# Patient Record
Sex: FEMALE | Race: ASIAN | NOT HISPANIC OR LATINO
[De-identification: names, ages, dates, MRNs, and addresses within clinical notes are randomized per-mention and may not be internally consistent; named-entity substitution may affect disease eponyms.]

---

## 2021-01-08 ENCOUNTER — APPOINTMENT (OUTPATIENT)
Dept: FAMILY MEDICINE | Facility: CLINIC | Age: 51
End: 2021-01-08
Payer: COMMERCIAL

## 2021-01-08 VITALS
WEIGHT: 102 LBS | BODY MASS INDEX: 21.41 KG/M2 | SYSTOLIC BLOOD PRESSURE: 116 MMHG | HEART RATE: 75 BPM | OXYGEN SATURATION: 100 % | TEMPERATURE: 98.4 F | HEIGHT: 58 IN | DIASTOLIC BLOOD PRESSURE: 76 MMHG

## 2021-01-08 DIAGNOSIS — Z82.49 FAMILY HISTORY OF ISCHEMIC HEART DISEASE AND OTHER DISEASES OF THE CIRCULATORY SYSTEM: ICD-10-CM

## 2021-01-08 DIAGNOSIS — Z83.3 FAMILY HISTORY OF DIABETES MELLITUS: ICD-10-CM

## 2021-01-08 DIAGNOSIS — Z78.9 OTHER SPECIFIED HEALTH STATUS: ICD-10-CM

## 2021-01-08 PROCEDURE — 36415 COLL VENOUS BLD VENIPUNCTURE: CPT

## 2021-01-08 PROCEDURE — 90686 IIV4 VACC NO PRSV 0.5 ML IM: CPT

## 2021-01-08 PROCEDURE — 99072 ADDL SUPL MATRL&STAF TM PHE: CPT

## 2021-01-08 PROCEDURE — 99386 PREV VISIT NEW AGE 40-64: CPT | Mod: 25

## 2021-01-08 PROCEDURE — G0008: CPT

## 2021-01-08 NOTE — HISTORY OF PRESENT ILLNESS
[FreeTextEntry1] : annual [de-identified] : 49 yo F with no significant PMH presents for annual. She is home health aide. Pt's PCP was Dr. Pearl who recently passed. She reports having hx brain mass found on CT incidentally many years ago. Pt was told that if she did not have any symptoms, did not need to keep following. She currently denies any headaches, vision changes, dizziness, numbness/weakness, etc. \par \par diet-- regular\par exercise-- active at work, does not exercise

## 2021-01-08 NOTE — HEALTH RISK ASSESSMENT
[No] : In the past 12 months have you used drugs other than those required for medical reasons? No [Patient reported mammogram was normal] : Patient reported mammogram was normal [Patient reported PAP Smear was normal] : Patient reported PAP Smear was normal [HIV test declined] : HIV test declined [Hepatitis C test declined] : Hepatitis C test declined [Fully functional (bathing, dressing, toileting, transferring, walking, feeding)] : Fully functional (bathing, dressing, toileting, transferring, walking, feeding) [Fully functional (using the telephone, shopping, preparing meals, housekeeping, doing laundry, using] : Fully functional and needs no help or supervision to perform IADLs (using the telephone, shopping, preparing meals, housekeeping, doing laundry, using transportation, managing medications and managing finances) [] : No [LTS1Exsmd] : 2 [MammogramDate] : 2016 [PapSmearDate] : 2019

## 2021-01-12 LAB
BASOPHILS # BLD AUTO: 0.03 K/UL
BASOPHILS NFR BLD AUTO: 0.5 %
CHOLEST SERPL-MCNC: 191 MG/DL
EOSINOPHIL # BLD AUTO: 0.1 K/UL
EOSINOPHIL NFR BLD AUTO: 1.7 %
ESTIMATED AVERAGE GLUCOSE: 108 MG/DL
HBA1C MFR BLD HPLC: 5.4 %
HCT VFR BLD CALC: 38.3 %
HDLC SERPL-MCNC: 49 MG/DL
HGB BLD-MCNC: 12.6 G/DL
IMM GRANULOCYTES NFR BLD AUTO: 0.2 %
LDLC SERPL CALC-MCNC: 129 MG/DL
LYMPHOCYTES # BLD AUTO: 1.72 K/UL
LYMPHOCYTES NFR BLD AUTO: 28.9 %
MAN DIFF?: NORMAL
MCHC RBC-ENTMCNC: 28.7 PG
MCHC RBC-ENTMCNC: 32.9 GM/DL
MCV RBC AUTO: 87.2 FL
MONOCYTES # BLD AUTO: 0.51 K/UL
MONOCYTES NFR BLD AUTO: 8.6 %
NEUTROPHILS # BLD AUTO: 3.58 K/UL
NEUTROPHILS NFR BLD AUTO: 60.1 %
NONHDLC SERPL-MCNC: 142 MG/DL
PLATELET # BLD AUTO: 225 K/UL
RBC # BLD: 4.39 M/UL
RBC # FLD: 11.9 %
TRIGL SERPL-MCNC: 64 MG/DL
TSH SERPL-ACNC: 1.6 UIU/ML
WBC # FLD AUTO: 5.95 K/UL

## 2021-01-20 LAB — HEMOCCULT STL QL IA: NEGATIVE

## 2021-02-16 LAB
ALBUMIN SERPL ELPH-MCNC: 4.6 G/DL
ALP BLD-CCNC: 70 U/L
ALT SERPL-CCNC: 11 U/L
ANION GAP SERPL CALC-SCNC: 15 MMOL/L
AST SERPL-CCNC: 17 U/L
BILIRUB SERPL-MCNC: 0.3 MG/DL
BUN SERPL-MCNC: 9 MG/DL
CALCIUM SERPL-MCNC: 9.5 MG/DL
CHLORIDE SERPL-SCNC: 103 MMOL/L
CO2 SERPL-SCNC: 21 MMOL/L
CREAT SERPL-MCNC: 0.76 MG/DL
GLUCOSE SERPL-MCNC: 103 MG/DL
POTASSIUM SERPL-SCNC: 4 MMOL/L
PROT SERPL-MCNC: 7.6 G/DL
SODIUM SERPL-SCNC: 140 MMOL/L

## 2021-03-11 ENCOUNTER — APPOINTMENT (OUTPATIENT)
Dept: NEUROLOGY | Facility: CLINIC | Age: 51
End: 2021-03-11

## 2021-04-19 ENCOUNTER — APPOINTMENT (OUTPATIENT)
Dept: FAMILY MEDICINE | Facility: CLINIC | Age: 51
End: 2021-04-19
Payer: COMMERCIAL

## 2021-04-19 PROCEDURE — 99072 ADDL SUPL MATRL&STAF TM PHE: CPT

## 2021-04-19 PROCEDURE — 36415 COLL VENOUS BLD VENIPUNCTURE: CPT

## 2021-04-22 LAB
M TB IFN-G BLD-IMP: NEGATIVE
MEV IGG FLD QL IA: >300 AU/ML
MEV IGG+IGM SER-IMP: POSITIVE
MUV AB SER-ACNC: NEGATIVE
MUV IGG SER QL IA: 7.8 AU/ML
QUANTIFERON TB PLUS MITOGEN MINUS NIL: 9.8 IU/ML
QUANTIFERON TB PLUS NIL: 0.03 IU/ML
QUANTIFERON TB PLUS TB1 MINUS NIL: 0.1 IU/ML
QUANTIFERON TB PLUS TB2 MINUS NIL: 0.06 IU/ML
RUBV IGG FLD-ACNC: 13.3 INDEX
RUBV IGG SER-IMP: POSITIVE
VZV AB TITR SER: POSITIVE
VZV IGG SER IF-ACNC: 1654 INDEX

## 2021-06-08 DIAGNOSIS — R51.9 HEADACHE, UNSPECIFIED: ICD-10-CM

## 2021-06-10 ENCOUNTER — APPOINTMENT (OUTPATIENT)
Dept: PAIN MANAGEMENT | Facility: CLINIC | Age: 51
End: 2021-06-10
Payer: COMMERCIAL

## 2021-06-10 VITALS
BODY MASS INDEX: 20.57 KG/M2 | SYSTOLIC BLOOD PRESSURE: 139 MMHG | DIASTOLIC BLOOD PRESSURE: 70 MMHG | WEIGHT: 98 LBS | HEART RATE: 103 BPM | HEIGHT: 58 IN

## 2021-06-10 DIAGNOSIS — G43.709 CHRONIC MIGRAINE W/OUT AURA, NOT INTRACTABLE, W/OUT STATUS MIGRAINOSUS: ICD-10-CM

## 2021-06-10 PROCEDURE — 99204 OFFICE O/P NEW MOD 45 MIN: CPT

## 2021-06-11 NOTE — PHYSICAL EXAM
[General Appearance - Alert] : alert [General Appearance - Well Nourished] : well nourished [General Appearance - Well Developed] : well developed [Impaired Insight] : insight and judgment were intact [Affect] : the affect was normal [Mood] : the mood was normal [Memory Recent] : recent memory was not impaired [Memory Remote] : remote memory was not impaired [Person] : oriented to person [Place] : oriented to place [Time] : oriented to time [Short Term Intact] : short term memory intact [Remote Intact] : remote memory intact [Registration Intact] : recent registration memory intact [Span Intact] : the attention span was normal [Concentration Intact] : normal concentrating ability [Visual Intact] : visual attention was ~T not ~L decreased [Writing A Sentence] : no difficulty writing a sentence [Fluency] : fluency intact [Comprehension] : comprehension intact [Reading] : reading intact [Current Events] : adequate knowledge of current events [Past History] : adequate knowledge of personal past history [Cranial Nerves Oculomotor (III)] : extraocular motion intact [Cranial Nerves Facial (VII)] : face symmetrical [Cranial Nerves Vestibulocochlear (VIII)] : hearing was intact bilaterally [Cranial Nerves Accessory (XI - Cranial And Spinal)] : head turning and shoulder shrug symmetric [Cranial Nerves Hypoglossal (XII)] : there was no tongue deviation with protrusion [No Muscle Atrophy] : normal bulk in all four extremities [Motor Handedness Right-Handed] : the patient is right hand dominant [Motor Strength Upper Extremities Bilaterally] : strength was normal in both upper extremities [Sensation Tactile Decrease] : light touch was intact [Tremor] : no tremor present [Dysdiadochokinesia Bilaterally] : not present [Sclera] : the sclera and conjunctiva were normal [Extraocular Movements] : extraocular movements were intact [No DAVID] : no internuclear ophthalmoplegia [Strabismus] : no strabismus was seen [Outer Ear] : the ears and nose were normal in appearance [Hearing Threshold Finger Rub Not [place]] : hearing was normal [Neck Appearance] : the appearance of the neck was normal [Neck Cervical Mass (___cm)] : no neck mass was observed [Exaggerated Use Of Accessory Muscles For Inspiration] : no accessory muscle use [Abnormal Walk] : normal gait [Involuntary Movements] : no involuntary movements were seen [Musculoskeletal - Swelling] : no joint swelling seen [Skin Color & Pigmentation] : normal skin color and pigmentation [] : no rash

## 2021-06-11 NOTE — HISTORY OF PRESENT ILLNESS
[FreeTextEntry1] : Pt is 51 yo woman who notes headaches which had worsened acutely around 7 years ago.  She had imaging done in [place] which showed ostensible non malignant mass- the pictures she arrived with appeared to possibly be fundoscopic images.  She continues to have headaches nearly monthly for an extended period to which she derives some benefit from Advil.  Some photophobia, mild phonophobia and no clear nausea.  Does get some neck pain separately from the headache events.\par No clear trigger.\par Does worsen with movement.\par No clear family history.\par Does get some allodynia with that pain as well.\par Uses advil with some benefit.\par Does get occasional numbness into head on both sides posteriorly in association with pain.\par No change in vision \par Occasional itchiness in ear but no change in hearing or pulsatile tinnitus.\par No change in gait and no other noted complaints.\par  [Headache] : headache [Nausea] : nausea [Photophobia] : photophobia [Phonophobia] : phonophobia [Neck Pain] : neck pain [Numbness] : numbness [Scalp Tenderness] : scalp tenderness [> 4 hours] : > 4 hours [4] : a minimum pain level of 4/10 [8] : a maximum pain level of 8/10 [Worsened] : The patient reports ~his/her~ symptoms since the last visit have worsened [de-identified] : variable

## 2021-06-11 NOTE — REVIEW OF SYSTEMS
[Feeling Tired] : feeling tired [Eye Pain] : eye pain [Arthralgias] : arthralgias [Neck Pain] : neck pain [Sleep Disturbances] : sleep disturbances [Fever] : no fever [Feeling Poorly] : not feeling poorly [Eyesight Problems] : no eyesight problems [Loss Of Hearing] : no hearing loss [Nasal Discharge] : no nasal discharge [Chest Pain] : no chest pain [Palpitations] : no palpitations [Shortness Of Breath] : no shortness of breath [Cough] : no cough [Constipation] : no constipation [Lower Back Pain] : no lower back pain [Skin Lesions] : no skin lesions [Skin Wound] : no skin wound [Itching] : no itching [Convulsions] : no convulsions [Fainting] : no fainting [Muscle Weakness] : no muscle weakness [Swollen Glands] : no swollen glands

## 2021-06-11 NOTE — ASSESSMENT
[FreeTextEntry1] : Would plan for mri brain given history of brain mass and worsening headache\par trial of prn rizatriptan\par referral to [facility] website

## 2021-07-25 ENCOUNTER — APPOINTMENT (OUTPATIENT)
Dept: MRI IMAGING | Facility: CLINIC | Age: 51
End: 2021-07-25

## 2021-08-05 ENCOUNTER — RESULT REVIEW (OUTPATIENT)
Age: 51
End: 2021-08-05

## 2021-08-07 ENCOUNTER — OUTPATIENT (OUTPATIENT)
Dept: OUTPATIENT SERVICES | Facility: HOSPITAL | Age: 51
LOS: 1 days | End: 2021-08-07
Payer: COMMERCIAL

## 2021-08-07 ENCOUNTER — APPOINTMENT (OUTPATIENT)
Dept: MRI IMAGING | Facility: CLINIC | Age: 51
End: 2021-08-07
Payer: COMMERCIAL

## 2021-08-07 DIAGNOSIS — N83.20 UNSPECIFIED OVARIAN CYSTS: Chronic | ICD-10-CM

## 2021-08-07 DIAGNOSIS — D49.6 NEOPLASM OF UNSPECIFIED BEHAVIOR OF BRAIN: ICD-10-CM

## 2021-08-07 PROCEDURE — A9585: CPT

## 2021-08-07 PROCEDURE — 70553 MRI BRAIN STEM W/O & W/DYE: CPT

## 2021-08-07 PROCEDURE — 70553 MRI BRAIN STEM W/O & W/DYE: CPT | Mod: 26

## 2021-08-10 ENCOUNTER — NON-APPOINTMENT (OUTPATIENT)
Age: 51
End: 2021-08-10

## 2021-08-18 ENCOUNTER — APPOINTMENT (OUTPATIENT)
Dept: NEUROLOGY | Facility: CLINIC | Age: 51
End: 2021-08-18
Payer: COMMERCIAL

## 2021-08-18 PROCEDURE — 99215 OFFICE O/P EST HI 40 MIN: CPT

## 2021-08-18 NOTE — DATA REVIEWED
[de-identified] : I personally reviewed neuroimaging dated\par 8/7/2021			\par \par On the contrast enhanced images, there is DURALLY-BASED LEFT MID-FRONTAL enhancement, WITH MODERATE SURROUNDING HYPERINTENSITY on the T2 weighted images, with signal change likely representing cerebral edema. \par DWI imaging shows no acute CVA.\par Overall I find the images to be most consistent with a meningioma abutting the superior sagittal since and causing both mass effect and cerebral edema on the left frontal lobe. \par Selected imaging was provided to the patient, along with a detailed verbal explanation of the issues at hand as outlined above.\par

## 2021-08-18 NOTE — HISTORY OF PRESENT ILLNESS
[FreeTextEntry1] : This is a pleasant 51 year old woman with longstanding history of headaches and brain mass found on imaging years ago. She has recent imaging showing a large left frontal mass, likely meningioma.\par \par She reports headaches over the past 8 years. In 2013, she had an MRI in [place]. Although the images have not been reviewed, she knows where that MR facility is located and could obtain a CD-ROM with those images for us.\par \par The headaches arise "out of the blue." The last headache was in 4/25/2021 (she keeps a diary). They do NOT awaken her from sleep. They are relieved with advil or tylenol (she does NOT take aspirin). There has been no aggravating or relieving factors.\par \par She denies shaking, seizures, seizure-like activity, trouble moving her arms or legs, or aphasia.

## 2021-08-18 NOTE — DISCUSSION/SUMMARY
[FreeTextEntry1] : DURALLY-BASED LESION -- I think this is a meningioma and should be resected, given her age, proximity to the venous sinus, as well as the mass effect and cerebral edema involving the left frontal lobe.\par \par VENOUS SINUS -- I ordered MRV as the lesion is very close to the venous sinus.\par \par DISPO -- I reached out to Neurosurgery to assess for resection.

## 2021-08-18 NOTE — PHYSICAL EXAM
[General Appearance - Alert] : alert [General Appearance - In No Acute Distress] : in no acute distress [Oriented To Time, Place, And Person] : oriented to person, place, and time [Impaired Insight] : insight and judgment were intact [Affect] : the affect was normal [Person] : oriented to person [Place] : oriented to place [Time] : oriented to time [Concentration Intact] : normal concentrating ability [Visual Intact] : visual attention was ~T not ~L decreased [Naming Objects] : no difficulty naming common objects [Repeating Phrases] : no difficulty repeating a phrase [Writing A Sentence] : no difficulty writing a sentence [Fluency] : fluency intact [Comprehension] : comprehension intact [Reading] : reading intact [Past History] : adequate knowledge of personal past history [Cranial Nerves Optic (II)] : visual acuity intact bilaterally,  visual fields full to confrontation, pupils equal round and reactive to light [Cranial Nerves Oculomotor (III)] : extraocular motion intact [Cranial Nerves Trigeminal (V)] : facial sensation intact symmetrically [Cranial Nerves Facial (VII)] : face symmetrical [Cranial Nerves Vestibulocochlear (VIII)] : hearing was intact bilaterally [Cranial Nerves Glossopharyngeal (IX)] : tongue and palate midline [Cranial Nerves Accessory (XI - Cranial And Spinal)] : head turning and shoulder shrug symmetric [Cranial Nerves Hypoglossal (XII)] : there was no tongue deviation with protrusion [Motor Tone] : muscle tone was normal in all four extremities [Motor Strength] : muscle strength was normal in all four extremities [No Muscle Atrophy] : normal bulk in all four extremities [Paresis Pronator Drift Right-Sided] : no pronator drift on the right [Paresis Pronator Drift Left-Sided] : no pronator drift on the left [Motor Strength Upper Extremities Bilaterally] : strength was normal in both upper extremities [Motor Strength Lower Extremities Bilaterally] : strength was normal in both lower extremities [Sensation Tactile Decrease] : light touch was intact [Abnormal Walk] : normal gait [Balance] : balance was intact [Past-pointing] : there was no past-pointing [Tremor] : no tremor present [2+] : Ankle jerk left 2+ [Plantar Reflex Right Only] : normal on the right [Plantar Reflex Left Only] : normal on the left [FreeTextEntry5] : 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 THE LEFT OPTIC DISC DOES NOT.

## 2021-09-22 ENCOUNTER — APPOINTMENT (OUTPATIENT)
Dept: NEUROLOGY | Facility: CLINIC | Age: 51
End: 2021-09-22
Payer: COMMERCIAL

## 2021-09-22 VITALS
OXYGEN SATURATION: 97 % | RESPIRATION RATE: 16 BRPM | HEART RATE: 90 BPM | SYSTOLIC BLOOD PRESSURE: 112 MMHG | DIASTOLIC BLOOD PRESSURE: 78 MMHG

## 2021-09-22 PROCEDURE — 99212 OFFICE O/P EST SF 10 MIN: CPT

## 2021-09-22 RX ORDER — RIZATRIPTAN BENZOATE 10 MG/1
10 TABLET ORAL
Qty: 1 | Refills: 5 | Status: DISCONTINUED | COMMUNITY
Start: 2021-06-10 | End: 2021-09-22

## 2021-09-22 NOTE — DISCUSSION/SUMMARY
[FreeTextEntry1] : Brain tumor - I encouraged her to have resection soon, while the tumor is not causing any trouble. I referred her to Dr. Ayala. She is agreeable to surgery and will follow-up. All questions answered. \par \par DISPO - Follow-up after resection.

## 2021-09-22 NOTE — PHYSICAL EXAM
[General Appearance - Alert] : alert [General Appearance - In No Acute Distress] : in no acute distress [Oriented To Time, Place, And Person] : oriented to person, place, and time [Impaired Insight] : insight and judgment were intact [Affect] : the affect was normal [Mood] : the mood was normal [Memory Recent] : recent memory was not impaired [Memory Remote] : remote memory was not impaired [Person] : oriented to person [Place] : oriented to place [Time] : oriented to time [Short Term Intact] : short term memory intact [Remote Intact] : remote memory intact [Span Intact] : the attention span was normal [Concentration Intact] : normal concentrating ability [Visual Intact] : visual attention was ~T not ~L decreased [Naming Objects] : no difficulty naming common objects [Repeating Phrases] : no difficulty repeating a phrase [Fluency] : fluency intact [Comprehension] : comprehension intact [Reading] : reading intact [Past History] : adequate knowledge of personal past history [Cranial Nerves Optic (II)] : visual acuity intact bilaterally,  visual fields full to confrontation, pupils equal round and reactive to light [Cranial Nerves Oculomotor (III)] : extraocular motion intact [Cranial Nerves Trigeminal (V)] : facial sensation intact symmetrically [Cranial Nerves Facial (VII)] : face symmetrical [Cranial Nerves Vestibulocochlear (VIII)] : hearing was intact bilaterally [Cranial Nerves Glossopharyngeal (IX)] : tongue and palate midline [Cranial Nerves Accessory (XI - Cranial And Spinal)] : head turning and shoulder shrug symmetric [Cranial Nerves Hypoglossal (XII)] : there was no tongue deviation with protrusion [Motor Tone] : muscle tone was normal in all four extremities [Motor Strength] : muscle strength was normal in all four extremities [Involuntary Movements] : no involuntary movements were seen [No Muscle Atrophy] : normal bulk in all four extremities [Sensation Tactile Decrease] : light touch was intact [Abnormal Walk] : normal gait [Balance] : balance was intact [Sclera] : the sclera and conjunctiva were normal [Extraocular Movements] : extraocular movements were intact [Respiration, Rhythm And Depth] : normal respiratory rhythm and effort [Edema] : there was no peripheral edema [Past-pointing] : there was no past-pointing [Tremor] : no tremor present [Dysdiadochokinesia Bilaterally] : not present [Coordination - Dysmetria Impaired Finger-to-Nose Bilateral] : not present

## 2021-09-22 NOTE — HISTORY OF PRESENT ILLNESS
[FreeTextEntry1] : This is a pleasant 51 year old woman with longstanding history of headaches and brain mass found on imaging years ago. She has recent imaging showing a large left frontal mass, likely meningioma.\par \par She reports headaches over the past 8 years. The headaches arise "out of the blue." They do NOT awaken her from sleep. They are relieved with advil or tylenol (she does NOT take aspirin). There has been no aggravating or relieving factors. Her last headache was over 2 weeks ago; they occur intermittently. \par \par She denies shaking, seizures, seizure-like activity, trouble moving her arms or legs, or aphasia. \par \par She presents today with new imaging, accompanied by her daughter. No new complaints.

## 2021-09-29 ENCOUNTER — NON-APPOINTMENT (OUTPATIENT)
Age: 51
End: 2021-09-29

## 2021-09-29 ENCOUNTER — APPOINTMENT (OUTPATIENT)
Dept: NEUROSURGERY | Facility: CLINIC | Age: 51
End: 2021-09-29
Payer: COMMERCIAL

## 2021-09-29 VITALS
BODY MASS INDEX: 20.57 KG/M2 | TEMPERATURE: 98.3 F | HEIGHT: 58 IN | HEART RATE: 73 BPM | DIASTOLIC BLOOD PRESSURE: 78 MMHG | SYSTOLIC BLOOD PRESSURE: 117 MMHG | WEIGHT: 98 LBS | OXYGEN SATURATION: 98 %

## 2021-09-29 PROCEDURE — 99205 OFFICE O/P NEW HI 60 MIN: CPT

## 2021-09-29 NOTE — REASON FOR VISIT
[Consultation] : a consultation visit [Referred By: _________] : Patient was referred by DALILA [Family Member] : family member

## 2021-10-04 NOTE — HISTORY OF PRESENT ILLNESS
[FreeTextEntry1] : meningioma  [de-identified] : 52 yo RH female who is completely healthy presents to the office for a neurosurgical consultation for a large left frontal meningioma. She has a history of headaches which are intermittently, but have worsened recently. She consulted with Dr. Guilherme Saravia who ordered brain MRI (8/7/2021) and showed a 3.0 cm extra axial left frontal mass representing meningioma with ME and edema. An MRA on 8/31/2021 was done as well. \par She had an MRI approximately 8 years ago which showed this lesion and as stated by the patient, it was the size of a "kidney bean". THere has been growth and her headaches have become worse. There is edema associated the the tumor and mass effect. \par no family history noted. \par \par Plan: Plan OR for meningioma resection left side\par cerebral angiogram and MRI brain with navigation day before surgery, admit to [facility] and next day surgery\par NEEDS SEDATION FOR MRI-CLAUSTROPHOBIA\par

## 2021-10-04 NOTE — PHYSICAL EXAM
[General Appearance - Alert] : alert [General Appearance - In No Acute Distress] : in no acute distress [Oriented To Time, Place, And Person] : oriented to person, place, and time [Impaired Insight] : insight and judgment were intact [Affect] : the affect was normal [Person] : oriented to person [Place] : oriented to place [Time] : oriented to time [Short Term Intact] : short term memory intact [Remote Intact] : remote memory intact [Span Intact] : the attention span was normal [Concentration Intact] : normal concentrating ability [Fluency] : fluency intact [Comprehension] : comprehension intact [Current Events] : adequate knowledge of current events [Past History] : adequate knowledge of personal past history [Vocabulary] : adequate range of vocabulary [Cranial Nerves Oculomotor (III)] : extraocular motion intact [Cranial Nerves Trigeminal (V)] : facial sensation intact symmetrically [Cranial Nerves Facial (VII)] : face symmetrical [Cranial Nerves Vestibulocochlear (VIII)] : hearing was intact bilaterally [Cranial Nerves Glossopharyngeal (IX)] : tongue and palate midline [Cranial Nerves Accessory (XI - Cranial And Spinal)] : head turning and shoulder shrug symmetric [Cranial Nerves Hypoglossal (XII)] : there was no tongue deviation with protrusion [Motor Tone] : muscle tone was normal in all four extremities [No Muscle Atrophy] : normal bulk in all four extremities [Motor Strength] : muscle strength was normal in all four extremities [Sensation Tactile Decrease] : light touch was intact [Balance] : balance was intact [Extraocular Movements] : extraocular movements were intact [Outer Ear] : the ears and nose were normal in appearance [Neck Appearance] : the appearance of the neck was normal [] : no respiratory distress [Respiration, Rhythm And Depth] : normal respiratory rhythm and effort [Exaggerated Use Of Accessory Muscles For Inspiration] : no accessory muscle use [Heart Rate And Rhythm] : heart rate was normal and rhythm regular [Abnormal Walk] : normal gait [Involuntary Movements] : no involuntary movements were seen [Skin Color & Pigmentation] : normal skin color and pigmentation [Skin Turgor] : normal skin turgor [Past-pointing] : there was no past-pointing [Tremor] : no tremor present

## 2021-10-04 NOTE — ASSESSMENT
[FreeTextEntry1] : 2021\par \par Washington Tao MD\par [facility]\par [facility]\par [address]\[place] [address]\par \par Re:	Naya Schroeder\par :	1970\par \par Dear Dr. Tao:\par \par Thank you for sending me Naya Schroeder in neurosurgical consultation.  She is a 51-year-old right-handed female who has had headaches.  She saw Dr. Guilherme Saravia and was referred to you after an MRI was performed that demonstrates a left posterior frontal extra-axial mass consistent with a meningioma.  The mass is parasagittal and seems to be connected to both the convexity dura, the falx, and the lateral wall of the superior sagittal sinus.  An MRA was also performed that was unremarkable.  \par \par The patient has a negative past medical history and a negative past surgical history.  She has no allergies.  She does not smoke.  She works as a home health aide.  She has 5 healthy children.  Family history is negative.  She is on no medication.  \par \par As mentioned, she has headache.  This is on and off, and she calls it a pressure in her eyes.  According to your examination, she had papilledema in the right eye, and of note, on the FLAIR and T2 sequences, there is subjacent edema in the brain.  She has a normal neurological examination.  Of note, she was told that she had a very small meningioma 8 years ago on an MRI performed for headaches, and the patient does not have these for review, but she is confident that it has grown.\par \par Given her young age, her good health, the subjacent edema, the headaches, and the fact that the tumor has grown, she is a candidate for microsurgical removal of the tumor.  My colleague Dr. Owens will perform an angiogram the day before to assess the superior sagittal sinus and for potential embolization, and then she will be a candidate for a left frontal craniotomy with microsurgical resection with [facility] neuronavigational guidance.  The patient will obtain a [facility] sequence MRI the day before surgery while in the hospital.  I have explained all this and explained all the indications, risks, and possible complications, inclusive of right-sided motor weakness, to the patient and her daughter who accompanied her.\par \par In summary, she is harboring a left posterior frontal parasagittal meningioma and is a candidate for surgery.\par \par I thank you, as always, for the confidence and courtesy of this referral.\par \par Sincerely,\par \par \par \par Patrick Ayala M.D., F.A.C.S.\par [facility]\par \par \par \par

## 2021-10-05 ENCOUNTER — OUTPATIENT (OUTPATIENT)
Dept: OUTPATIENT SERVICES | Facility: HOSPITAL | Age: 51
LOS: 1 days | End: 2021-10-05
Payer: COMMERCIAL

## 2021-10-05 ENCOUNTER — RESULT REVIEW (OUTPATIENT)
Age: 51
End: 2021-10-05

## 2021-10-05 VITALS
RESPIRATION RATE: 16 BRPM | SYSTOLIC BLOOD PRESSURE: 100 MMHG | HEART RATE: 67 BPM | HEIGHT: 58 IN | WEIGHT: 102.07 LBS | DIASTOLIC BLOOD PRESSURE: 60 MMHG | TEMPERATURE: 98 F

## 2021-10-05 DIAGNOSIS — N83.20 UNSPECIFIED OVARIAN CYSTS: Chronic | ICD-10-CM

## 2021-10-05 DIAGNOSIS — Z01.818 ENCOUNTER FOR OTHER PREPROCEDURAL EXAMINATION: ICD-10-CM

## 2021-10-05 DIAGNOSIS — D32.0 BENIGN NEOPLASM OF CEREBRAL MENINGES: ICD-10-CM

## 2021-10-05 DIAGNOSIS — Z29.9 ENCOUNTER FOR PROPHYLACTIC MEASURES, UNSPECIFIED: ICD-10-CM

## 2021-10-05 LAB
A1C WITH ESTIMATED AVERAGE GLUCOSE RESULT: 5.8 % — HIGH (ref 4–5.6)
ANION GAP SERPL CALC-SCNC: 15 MMOL/L — SIGNIFICANT CHANGE UP (ref 5–17)
APTT BLD: 33.5 SEC — SIGNIFICANT CHANGE UP (ref 27.5–35.5)
BASOPHILS # BLD AUTO: 0.03 K/UL — SIGNIFICANT CHANGE UP (ref 0–0.2)
BASOPHILS NFR BLD AUTO: 0.6 % — SIGNIFICANT CHANGE UP (ref 0–2)
BLD GP AB SCN SERPL QL: SIGNIFICANT CHANGE UP
BUN SERPL-MCNC: 11.2 MG/DL — SIGNIFICANT CHANGE UP (ref 8–20)
CALCIUM SERPL-MCNC: 10.1 MG/DL — SIGNIFICANT CHANGE UP (ref 8.6–10.2)
CHLORIDE SERPL-SCNC: 101 MMOL/L — SIGNIFICANT CHANGE UP (ref 98–107)
CO2 SERPL-SCNC: 24 MMOL/L — SIGNIFICANT CHANGE UP (ref 22–29)
CREAT SERPL-MCNC: 0.58 MG/DL — SIGNIFICANT CHANGE UP (ref 0.5–1.3)
EOSINOPHIL # BLD AUTO: 0.12 K/UL — SIGNIFICANT CHANGE UP (ref 0–0.5)
EOSINOPHIL NFR BLD AUTO: 2.3 % — SIGNIFICANT CHANGE UP (ref 0–6)
ESTIMATED AVERAGE GLUCOSE: 120 MG/DL — HIGH (ref 68–114)
GLUCOSE SERPL-MCNC: 115 MG/DL — HIGH (ref 70–99)
HCT VFR BLD CALC: 39.8 % — SIGNIFICANT CHANGE UP (ref 34.5–45)
HGB BLD-MCNC: 13.2 G/DL — SIGNIFICANT CHANGE UP (ref 11.5–15.5)
IMM GRANULOCYTES NFR BLD AUTO: 0.2 % — SIGNIFICANT CHANGE UP (ref 0–1.5)
INR BLD: 1.02 RATIO — SIGNIFICANT CHANGE UP (ref 0.88–1.16)
LYMPHOCYTES # BLD AUTO: 1.72 K/UL — SIGNIFICANT CHANGE UP (ref 1–3.3)
LYMPHOCYTES # BLD AUTO: 33.5 % — SIGNIFICANT CHANGE UP (ref 13–44)
MCHC RBC-ENTMCNC: 27.8 PG — SIGNIFICANT CHANGE UP (ref 27–34)
MCHC RBC-ENTMCNC: 33.2 GM/DL — SIGNIFICANT CHANGE UP (ref 32–36)
MCV RBC AUTO: 83.8 FL — SIGNIFICANT CHANGE UP (ref 80–100)
MONOCYTES # BLD AUTO: 0.39 K/UL — SIGNIFICANT CHANGE UP (ref 0–0.9)
MONOCYTES NFR BLD AUTO: 7.6 % — SIGNIFICANT CHANGE UP (ref 2–14)
MRSA PCR RESULT.: SIGNIFICANT CHANGE UP
NEUTROPHILS # BLD AUTO: 2.87 K/UL — SIGNIFICANT CHANGE UP (ref 1.8–7.4)
NEUTROPHILS NFR BLD AUTO: 55.8 % — SIGNIFICANT CHANGE UP (ref 43–77)
PLATELET # BLD AUTO: 254 K/UL — SIGNIFICANT CHANGE UP (ref 150–400)
POTASSIUM SERPL-MCNC: 4.2 MMOL/L — SIGNIFICANT CHANGE UP (ref 3.5–5.3)
POTASSIUM SERPL-SCNC: 4.2 MMOL/L — SIGNIFICANT CHANGE UP (ref 3.5–5.3)
PROTHROM AB SERPL-ACNC: 11.8 SEC — SIGNIFICANT CHANGE UP (ref 10.6–13.6)
RBC # BLD: 4.75 M/UL — SIGNIFICANT CHANGE UP (ref 3.8–5.2)
RBC # FLD: 12.4 % — SIGNIFICANT CHANGE UP (ref 10.3–14.5)
S AUREUS DNA NOSE QL NAA+PROBE: SIGNIFICANT CHANGE UP
SODIUM SERPL-SCNC: 140 MMOL/L — SIGNIFICANT CHANGE UP (ref 135–145)
WBC # BLD: 5.14 K/UL — SIGNIFICANT CHANGE UP (ref 3.8–10.5)
WBC # FLD AUTO: 5.14 K/UL — SIGNIFICANT CHANGE UP (ref 3.8–10.5)

## 2021-10-05 PROCEDURE — 71046 X-RAY EXAM CHEST 2 VIEWS: CPT | Mod: 26

## 2021-10-05 PROCEDURE — 93010 ELECTROCARDIOGRAM REPORT: CPT

## 2021-10-05 RX ORDER — INFLUENZA VIRUS VACCINE 15; 15; 15; 15 UG/.5ML; UG/.5ML; UG/.5ML; UG/.5ML
0.5 SUSPENSION INTRAMUSCULAR ONCE
Refills: 0 | Status: DISCONTINUED | OUTPATIENT
Start: 2021-10-05 | End: 2021-10-19

## 2021-10-05 NOTE — H&P PST ADULT - HISTORY OF PRESENT ILLNESS
51 year old Right Handed female present today for PSt.   She has a large left frontal meningioma. She reports history of headaches which are intermittently, but have worsened recently. She consulted with Dr. Guilherme Saravia who ordered brain MRI (8/7/2021) and showed a 3.0 cm extra axial left frontal mass representing meningioma with mass edema. An MRA on 8/31/2021 was done as well.  She had an MRI approximately 8 years ago which showed this lesion and as stated by the patient, it was the size of a "kidney bean". The headaches have become worsen as the mass grew. There is edema associated the the tumor and mass effect.   She is now schedule for cerebral angiogram before surgery to resect the meningioma.

## 2021-10-05 NOTE — PATIENT PROFILE ADULT - NSPROHMSYMPCOND_GEN_A_NUR
pre-op instructions reviewed NP to complete, MRSA/MSSA swabbed in PST. Pt is a pre-admit & informed to change Covid testing date from 10.9.21 to 10.8.21

## 2021-10-05 NOTE — H&P PST ADULT - NSICDXFAMILYHX_GEN_ALL_CORE_FT
FAMILY HISTORY:  Mother  Still living? Unknown  FH: diabetes mellitus, Age at diagnosis: Age Unknown  FH: heart attack, Age at diagnosis: Age Unknown

## 2021-10-05 NOTE — H&P PST ADULT - ASSESSMENT
51 year old Right Handed female present with a large left frontal meningioma. There is edema associated the tumor and mass effect.   She is now schedule for cerebral angiogram before surgery to resect the meningioma.   Patient educated on written and verbal preop instructions.    Patient verbalized understanding after "teach back" method of instruction were given   Medications reviewed, instructions given on what medications to take and what not to take.  Pt instructed to stop Herbals or anti-inflammatory meds one week prior to surgery and discuss with PMD.

## 2021-10-06 RX ORDER — SODIUM CHLORIDE 9 MG/ML
3 INJECTION INTRAMUSCULAR; INTRAVENOUS; SUBCUTANEOUS EVERY 8 HOURS
Refills: 0 | Status: DISCONTINUED | OUTPATIENT
Start: 2021-10-11 | End: 2021-10-12

## 2021-10-07 ENCOUNTER — NON-APPOINTMENT (OUTPATIENT)
Age: 51
End: 2021-10-07

## 2021-10-07 ENCOUNTER — LABORATORY RESULT (OUTPATIENT)
Age: 51
End: 2021-10-07

## 2021-10-07 ENCOUNTER — APPOINTMENT (OUTPATIENT)
Dept: INTERNAL MEDICINE | Facility: CLINIC | Age: 51
End: 2021-10-07
Payer: COMMERCIAL

## 2021-10-07 VITALS
TEMPERATURE: 97.1 F | SYSTOLIC BLOOD PRESSURE: 124 MMHG | BODY MASS INDEX: 21.74 KG/M2 | RESPIRATION RATE: 12 BRPM | OXYGEN SATURATION: 98 % | HEART RATE: 83 BPM | WEIGHT: 104 LBS | DIASTOLIC BLOOD PRESSURE: 78 MMHG

## 2021-10-07 PROCEDURE — 99214 OFFICE O/P EST MOD 30 MIN: CPT | Mod: 25

## 2021-10-07 PROCEDURE — 93000 ELECTROCARDIOGRAM COMPLETE: CPT

## 2021-10-07 NOTE — PHYSICAL EXAM
[No Acute Distress] : no acute distress [Well Nourished] : well nourished [Well Developed] : well developed [Well-Appearing] : well-appearing [Normal Sclera/Conjunctiva] : normal sclera/conjunctiva [PERRL] : pupils equal round and reactive to light [EOMI] : extraocular movements intact [Normal Outer Ear/Nose] : the outer ears and nose were normal in appearance [Normal Oropharynx] : the oropharynx was normal [Normal TMs] : both tympanic membranes were normal [No JVD] : no jugular venous distention [No Lymphadenopathy] : no lymphadenopathy [Supple] : supple [Thyroid Normal, No Nodules] : the thyroid was normal and there were no nodules present [No Respiratory Distress] : no respiratory distress  [No Accessory Muscle Use] : no accessory muscle use [Clear to Auscultation] : lungs were clear to auscultation bilaterally [Normal Rate] : normal rate  [Normal S1, S2] : normal S1 and S2 [Regular Rhythm] : with a regular rhythm [No Murmur] : no murmur heard [No Varicosities] : no varicosities [Pedal Pulses Present] : the pedal pulses are present [No Edema] : there was no peripheral edema [No Extremity Clubbing/Cyanosis] : no extremity clubbing/cyanosis [Soft] : abdomen soft [Non Tender] : non-tender [Non-distended] : non-distended [No Masses] : no abdominal mass palpated [Normal Bowel Sounds] : normal bowel sounds [Normal Supraclavicular Nodes] : no supraclavicular lymphadenopathy [Normal Posterior Cervical Nodes] : no posterior cervical lymphadenopathy [Normal Anterior Cervical Nodes] : no anterior cervical lymphadenopathy [No CVA Tenderness] : no CVA  tenderness [No Spinal Tenderness] : no spinal tenderness [No Joint Swelling] : no joint swelling [Grossly Normal Strength/Tone] : grossly normal strength/tone [No Rash] : no rash [Coordination Grossly Intact] : coordination grossly intact [No Focal Deficits] : no focal deficits [Normal Gait] : normal gait [Speech Grossly Normal] : speech grossly normal [Normal Affect] : the affect was normal [Alert and Oriented x3] : oriented to person, place, and time [Normal Mood] : the mood was normal [Normal Insight/Judgement] : insight and judgment were intact

## 2021-10-08 ENCOUNTER — APPOINTMENT (OUTPATIENT)
Dept: DISASTER EMERGENCY | Facility: CLINIC | Age: 51
End: 2021-10-08

## 2021-10-09 ENCOUNTER — APPOINTMENT (OUTPATIENT)
Dept: DISASTER EMERGENCY | Facility: CLINIC | Age: 51
End: 2021-10-09

## 2021-10-10 LAB — SARS-COV-2 N GENE NPH QL NAA+PROBE: NOT DETECTED

## 2021-10-11 ENCOUNTER — INPATIENT (INPATIENT)
Facility: HOSPITAL | Age: 51
LOS: 3 days | Discharge: HOME | DRG: 25 | End: 2021-10-15
Attending: NEUROLOGICAL SURGERY | Admitting: PSYCHIATRY & NEUROLOGY
Payer: COMMERCIAL

## 2021-10-11 ENCOUNTER — TRANSCRIPTION ENCOUNTER (OUTPATIENT)
Age: 51
End: 2021-10-11

## 2021-10-11 ENCOUNTER — APPOINTMENT (OUTPATIENT)
Dept: NEUROLOGY | Facility: HOSPITAL | Age: 51
End: 2021-10-11
Payer: COMMERCIAL

## 2021-10-11 VITALS
OXYGEN SATURATION: 100 % | RESPIRATION RATE: 20 BRPM | TEMPERATURE: 98 F | SYSTOLIC BLOOD PRESSURE: 126 MMHG | DIASTOLIC BLOOD PRESSURE: 59 MMHG | HEART RATE: 72 BPM | HEIGHT: 58 IN | WEIGHT: 98.11 LBS

## 2021-10-11 DIAGNOSIS — D32.0 BENIGN NEOPLASM OF CEREBRAL MENINGES: ICD-10-CM

## 2021-10-11 DIAGNOSIS — N83.20 UNSPECIFIED OVARIAN CYSTS: Chronic | ICD-10-CM

## 2021-10-11 LAB
HCG SERPL QL: NEGATIVE — SIGNIFICANT CHANGE UP
SARS-COV-2 RNA SPEC QL NAA+PROBE: SIGNIFICANT CHANGE UP

## 2021-10-11 PROCEDURE — 36227 PLACE CATH XTRNL CAROTID: CPT

## 2021-10-11 PROCEDURE — 99291 CRITICAL CARE FIRST HOUR: CPT

## 2021-10-11 PROCEDURE — 61624 TCAT PERM OCCLS/EMBOLJ CNS: CPT

## 2021-10-11 PROCEDURE — 75894 X-RAYS TRANSCATH THERAPY: CPT | Mod: 26

## 2021-10-11 PROCEDURE — 36224 PLACE CATH CAROTD ART: CPT | Mod: 50

## 2021-10-11 PROCEDURE — 75898 FOLLOW-UP ANGIOGRAPHY: CPT | Mod: 26

## 2021-10-11 RX ORDER — SODIUM CHLORIDE 9 MG/ML
1000 INJECTION INTRAMUSCULAR; INTRAVENOUS; SUBCUTANEOUS
Refills: 0 | Status: DISCONTINUED | OUTPATIENT
Start: 2021-10-11 | End: 2021-10-12

## 2021-10-11 RX ORDER — HYDRALAZINE HCL 50 MG
10 TABLET ORAL
Refills: 0 | Status: DISCONTINUED | OUTPATIENT
Start: 2021-10-11 | End: 2021-10-12

## 2021-10-11 RX ORDER — ACETAMINOPHEN 500 MG
650 TABLET ORAL EVERY 6 HOURS
Refills: 0 | Status: DISCONTINUED | OUTPATIENT
Start: 2021-10-11 | End: 2021-10-12

## 2021-10-11 RX ORDER — SODIUM CHLORIDE 9 MG/ML
500 INJECTION INTRAMUSCULAR; INTRAVENOUS; SUBCUTANEOUS ONCE
Refills: 0 | Status: COMPLETED | OUTPATIENT
Start: 2021-10-11 | End: 2021-10-11

## 2021-10-11 RX ORDER — ONDANSETRON 8 MG/1
4 TABLET, FILM COATED ORAL EVERY 6 HOURS
Refills: 0 | Status: DISCONTINUED | OUTPATIENT
Start: 2021-10-11 | End: 2021-10-12

## 2021-10-11 RX ORDER — CHLORHEXIDINE GLUCONATE 213 G/1000ML
1 SOLUTION TOPICAL EVERY 12 HOURS
Refills: 0 | Status: DISCONTINUED | OUTPATIENT
Start: 2021-10-11 | End: 2021-10-12

## 2021-10-11 RX ADMIN — SODIUM CHLORIDE 100 MILLILITER(S): 9 INJECTION INTRAMUSCULAR; INTRAVENOUS; SUBCUTANEOUS at 23:20

## 2021-10-11 RX ADMIN — SODIUM CHLORIDE 500 MILLILITER(S): 9 INJECTION INTRAMUSCULAR; INTRAVENOUS; SUBCUTANEOUS at 23:20

## 2021-10-11 RX ADMIN — SODIUM CHLORIDE 3 MILLILITER(S): 9 INJECTION INTRAMUSCULAR; INTRAVENOUS; SUBCUTANEOUS at 22:18

## 2021-10-11 RX ADMIN — ONDANSETRON 4 MILLIGRAM(S): 8 TABLET, FILM COATED ORAL at 12:32

## 2021-10-11 RX ADMIN — ONDANSETRON 4 MILLIGRAM(S): 8 TABLET, FILM COATED ORAL at 23:20

## 2021-10-11 RX ADMIN — SODIUM CHLORIDE 3 MILLILITER(S): 9 INJECTION INTRAMUSCULAR; INTRAVENOUS; SUBCUTANEOUS at 14:00

## 2021-10-11 NOTE — CONSULT NOTE ADULT - ASSESSMENT
Assessment:   52 yo F with PMH headaches, found to have L frontal meningioma, scheduled for resection with Dr. Ayala on 10/12. Patient underwent tumor embolization today with Dr. Owens, tolerated procedure well and currently has no deficits.       Plan:   Neuro:  - Post angio neuro checks, then Q1 hour Neuro checks, Q1 hour Vitals  - HOB 30 degrees, Neck midline position  - Maintain normothermia, PO acetaminophen for temp>38 C or pain  - Neurosurgical Imaging Reviewed  - Pain management & Sedation: tylenol PRN  - Turn and Position Q2  /  Activity ad mary, with assistance  	  CV:  - SBP Goal:   - BP regimen: hydralazine PRN    Pulm:  - Supplemental O2 PRN to maintain Spo2>92%  - Chest PT, OOB, Pulmonary Toilet    GI:  - Nutrition: regular diet, NPO at midnight   - Zofran PRN for nausea   	  Gu:  - Voiding  - NS @ 100   - I&O Q1 hour  - Monitor Electrolytes & Renal Function    Heme:  - Monitor H&H  - Chemical DVT prophylaxis: Chemical DVT prophylaxis is contraindicated due to OR planned for tomorrow 10/12  - Mechanical DVT Prophylaxis: Maintain B/L LE sequential compression devices  	  ID:  - Monitor WBC and Temperature    Endo  - Monitor BGL, maintain <180  - HbA1C 5.8%     
Assessment:   50 yo F with PMH headaches, found to have L frontal meningioma, scheduled for resection with Dr. Ayala on 10/12. Patient underwent tumor embolization today with Dr. Owens, tolerated procedure well and currently has no deficits.       Plan:   - Q1 hour neuro checks  - SBP goal   - DVT prophylaxis: SCDs, no chemical prophylaxis 2/2 OR tomorrow  - NPO @ midnight   - Coags, COVID swab in am   - OR tomorrow 10/12 with Dr. Ayala for tumor resection

## 2021-10-11 NOTE — CONSULT NOTE ADULT - SUBJECTIVE AND OBJECTIVE BOX
Patient is a 51y old  Female who presents with a chief complaint of L frontal meningioma   HPI:  51 year old Right Handed female presents after MMA tumor embolization of L frontal meningioma. She reports history of headaches which are intermittent, but have become more frequent recently. She consulted with Dr. Guilherme Saravia who ordered brain MRI (8/7/2021) and showed a 3.0 cm extra axial left frontal mass representing meningioma with mass edema. An MRA on 8/31/2021 was done as well.  She had an MRI approximately 8 years ago which showed this lesion and as stated by the patient, it was the size of a "kidney bean". The headaches have become worsen as the mass grew. There is edema associated the the tumor and mass effect. Patient underwent MMA tumor embolization without difficulty. Patient is being admitted to the neuro ICU in preparation of tumor resection planned with Dr. Ayala tomorrow 10/12. Patient currently has no complaints, including headache, dizziness, weakness, numbness, tingling, N/V, CP, SOB.      PAST MEDICAL & SURGICAL HISTORY:  History of cerebral meningioma      FAMILY HISTORY:  FH: diabetes mellitus (Mother)  FH: heart attack (Mother)      Allergies  No Known Allergies      REVIEW OF SYSTEMS  Negative except as noted in HPI  CONSTITUTIONAL: No fever, weight loss, or fatigue  EYES: No eye pain, visual disturbances, or discharge  ENMT:  No difficulty hearing, tinnitus, vertigo; No sinus or throat pain  RESPIRATORY: No cough, wheezing, chills or hemoptysis; No shortness of breath  CARDIOVASCULAR: No chest pain, palpitations, dizziness, or leg swelling  GASTROINTESTINAL: No abdominal or epigastric pain. No nausea, vomiting, or hematemesis; No diarrhea or constipation. No melena or hematochezia.  GENITOURINARY: No dysuria, frequency, hematuria, or incontinence  NEUROLOGICAL: No headaches, memory loss, loss of strength, numbness, or tremors  SKIN: R Groin dressing in place, no hematoma  MUSCULOSKELETAL: No joint pain or swelling; No muscle, back, or extremity pain      Home Medications: none      MEDICATIONS:  Antibiotics:    Neuro:    Anticoagulation:    OTHER:    IVF:  sodium chloride 0.9% lock flush 3 milliLiter(s) IV Push every 8 hours  sodium chloride 0.9%. 1000 milliLiter(s) IV Continuous <Continuous>      Vital Signs Last 24 Hrs  T(C): 36.5 (11 Oct 2021 08:01), Max: 36.5 (11 Oct 2021 08:01)  T(F): 97.7 (11 Oct 2021 08:01), Max: 97.7 (11 Oct 2021 08:01)  HR: 72 (11 Oct 2021 08:01) (72 - 72)  BP: 126/59 (11 Oct 2021 08:01) (126/59 - 126/59)  RR: 20 (11 Oct 2021 08:01) (20 - 20)  SpO2: 100% (11 Oct 2021 08:01) (100% - 100%)      Physical Exam:  Constitutional: NAD, lying in bed  Neuro  * Mental Status:  GCS 15: Awake, alert, oriented to conversation. No aphasia or difficulty speaking. No dysarthria.  * Cranial Nerves: Cnii-Cnxii grossly intact. PERRL, EOMI, tongue midline, no gaze deviation  * Motor: RUE 5/5, LUE 5/5, RLE 5/5, LLE 5/5, no drift or dysmetria  * Sensory: Sensation intact to light touch  * Reflexes: not assessed   Cardiovascular:  S1, S2 no murmurs appreciated.  Regular rate and rhythm.  Eyes: See neurologic examination with detailed examination of eyes.  ENT: No JVD, Trachea Midline  Respiratory: Clear to auscultation.  Gastrointestinal: Soft, nontender, nondistended.  Genitourinary: [ ] Monzon, [ x ] No Monzon.   Musculoskeletal: No muscle wasting noted, (See neurologic assessment for full muscle strength assessment) No pretibial edema appreciated, no appreciable calf tenderness.  Skin:  no wounds, no redness, no abrasions noted  Musculoskeletal: See detailed muscle strength examination, listed under neurologic examination.  Hematologic / Lymph / Immunologic: No bleeding from IV sites or wounds, No lymphadenopathy, No Hives or allergic type skin lesions      LABS: from pre-surgical testing, current labs pending   10/5/21: WBC 5.1, Hgb 13.2, Hct 39.8, platelets 254  10/5/21: Na 140, K 4.2, Cl 101, Co2 24, BUN 11.2, Cr 0.58, glucose 115  10/5/21: PTT 33.5, INR 1.02      RADIOLOGY & ADDITIONAL STUDIES:  8/7/21 MRI head:  IMPRESSION:  3.0 cm extra-axial left frontal parasagittal mass which likely represents a meningioma. There is prominent mass effect on the adjacent medial left frontal lobe with associated underlying mild parenchymal vasogenic edema. No midline shift.    --- End of Report ---    LAURA LAWSON MD; Resident Radiology  This document has been electronically signed.  MOE SUN MD; Attending Radiologist  This document has been electronically signed. Aug 10 2021 3:05PM  
Patient is a 51y old  Female who presents with a chief complaint of L frontal meningioma   HPI:  51 year old Right Handed female presents after MMA tumor embolization of L frontal meningioma. She reports history of headaches which are intermittent, but have become more frequent recently. She consulted with Dr. Guilherme Saravia who ordered brain MRI (8/7/2021) and showed a 3.0 cm extra axial left frontal mass representing meningioma with mass edema. An MRA on 8/31/2021 was done as well.  She had an MRI approximately 8 years ago which showed this lesion and as stated by the patient, it was the size of a "kidney bean". The headaches have become worsen as the mass grew. There is edema associated the the tumor and mass effect. Patient underwent MMA tumor embolization without difficulty. Patient is being admitted to the neuro ICU in preparation of tumor resection planned with Dr. Ayala tomorrow 10/12. Patient currently has no complaints, including headache, dizziness, weakness, numbness, tingling, N/V, CP, SOB.      PAST MEDICAL & SURGICAL HISTORY:  History of cerebral meningioma      FAMILY HISTORY:  FH: diabetes mellitus (Mother)  FH: heart attack (Mother)      Allergies  No Known Allergies      REVIEW OF SYSTEMS  Negative except as noted in HPI  CONSTITUTIONAL: No fever, weight loss, or fatigue  EYES: No eye pain, visual disturbances, or discharge  ENMT:  No difficulty hearing, tinnitus, vertigo; No sinus or throat pain  RESPIRATORY: No cough, wheezing, chills or hemoptysis; No shortness of breath  CARDIOVASCULAR: No chest pain, palpitations, dizziness, or leg swelling  GASTROINTESTINAL: No abdominal or epigastric pain. No nausea, vomiting, or hematemesis; No diarrhea or constipation. No melena or hematochezia.  GENITOURINARY: No dysuria, frequency, hematuria, or incontinence  NEUROLOGICAL: No headaches, memory loss, loss of strength, numbness, or tremors  SKIN: R Groin dressing in place, no hematoma  MUSCULOSKELETAL: No joint pain or swelling; No muscle, back, or extremity pain      Home Medications: none      MEDICATIONS:  Antibiotics:    Neuro:    Anticoagulation:    OTHER:    IVF:  sodium chloride 0.9% lock flush 3 milliLiter(s) IV Push every 8 hours  sodium chloride 0.9%. 1000 milliLiter(s) IV Continuous <Continuous>      Vital Signs Last 24 Hrs  T(C): 36.5 (11 Oct 2021 08:01), Max: 36.5 (11 Oct 2021 08:01)  T(F): 97.7 (11 Oct 2021 08:01), Max: 97.7 (11 Oct 2021 08:01)  HR: 72 (11 Oct 2021 08:01) (72 - 72)  BP: 126/59 (11 Oct 2021 08:01) (126/59 - 126/59)  RR: 20 (11 Oct 2021 08:01) (20 - 20)  SpO2: 100% (11 Oct 2021 08:01) (100% - 100%)      Physical Exam:  Constitutional: NAD, lying in bed  Neuro  * Mental Status:  GCS 15: Awake, alert, oriented to conversation. No aphasia or difficulty speaking. No dysarthria.  * Cranial Nerves: Cnii-Cnxii grossly intact. PERRL, EOMI, tongue midline, no gaze deviation  * Motor: RUE 5/5, LUE 5/5, RLE 5/5, LLE 5/5, no drift or dysmetria  * Sensory: Sensation intact to light touch  * Reflexes: not assessed   Cardiovascular:  S1, S2 no murmurs appreciated.  Regular rate and rhythm.  Eyes: See neurologic examination with detailed examination of eyes.  ENT: No JVD, Trachea Midline  Respiratory: Clear to auscultation.  Gastrointestinal: Soft, nontender, nondistended.  Genitourinary: [ ] Monzon, [ x ] No Monzon.   Musculoskeletal: No muscle wasting noted, (See neurologic assessment for full muscle strength assessment) No pretibial edema appreciated, no appreciable calf tenderness.  Skin:  no wounds, no redness, no abrasions noted  Musculoskeletal: See detailed muscle strength examination, listed under neurologic examination.  Hematologic / Lymph / Immunologic: No bleeding from IV sites or wounds, No lymphadenopathy, No Hives or allergic type skin lesions      LABS: from pre-surgical testing, current labs pending   10/5/21: WBC 5.1, Hgb 13.2, Hct 39.8, platelets 254  10/5/21: Na 140, K 4.2, Cl 101, Co2 24, BUN 11.2, Cr 0.58, glucose 115  10/5/21: PTT 33.5, INR 1.02      RADIOLOGY & ADDITIONAL STUDIES:  8/7/21 MRI head:  IMPRESSION:  3.0 cm extra-axial left frontal parasagittal mass which likely represents a meningioma. There is prominent mass effect on the adjacent medial left frontal lobe with associated underlying mild parenchymal vasogenic edema. No midline shift.    --- End of Report ---    LAURA LAWSON MD; Resident Radiology  This document has been electronically signed.  MOE SUN MD; Attending Radiologist  This document has been electronically signed. Aug 10 2021 3:05PM

## 2021-10-11 NOTE — CONSULT NOTE ADULT - NSCONSULTADDITIONALINFOA_GEN_ALL_CORE
NSGY Attg:    see above    patient seen and examined by PA staff    agree with exam as above    agree with plan as above

## 2021-10-11 NOTE — CONSULT NOTE ADULT - ATTENDING COMMENTS
Pt seen and examined. Agree with above assessment and plan.    50 yo F with PMH headaches, found to have L frontal meningioma, scheduled for resection with Dr. Ayala on 10/12. s/p tumor embolization today    neurointact  planned OR tomorrw  NPO after midnight

## 2021-10-11 NOTE — CHART NOTE - NSCHARTNOTEFT_GEN_A_CORE
Neurointerventional Surgery Post Procedure Note    Procedure: Selective Cerebral Angiography     Pre- Procedure Diagnosis: L frontal meningioma  Post- Procedure Diagnosis: L frontal meningioma s/p middle meningeal artery embolization with 1.5 ml embospheres and 2 coils (1x3 mm and 1.5x3 mm)    : Dr. Elfego Owens MD  Physician Assistant: Rand Snell PA-C  Nurse: Jolie Alcazar RN, Stefania Bergeron RN  Anesthesiologist: Dr. Reena GOMEZ  Radiology Tech: Ruben Joel RT, Edmundo Fatima RT    Sheath: 4 [place] Sheath    I/Os: estimated blood loss less than 10cc,  IV fluids 400 cc, Contrast: Omnipaque 240 40  cc, 2 mg cardene, 1.5 ml embospheres    Vitals:  /108  HR 79  Spo2 100 %    Preliminary Report:  Under a 4 [place] Fubuki sheath via the right groin under MAC sedation via left internal carotid artery, left external carotid artery, right internal carotid artery, right external carotid artery, a selective cerebral angiography  was performed and reveals L frontal meningioma s/p middle meningeal artery embolization with 1.5 ml embospheres and 2 coils (1x3 mm and 1.5x3 mm). ( Official note to follow).    Patient tolerated procedure well.  Patient remains hemodynamically stable, no change in neurological status compared to baseline.  Results were discussed with patient, patient's family and Neurosurgery.  Right groin sheath  was discontinued. Hemostasis was obtained with approximately 15 minutes of manual compression.     No active bleeding, no hematoma, no ecchymosis.   Quick clot and safeguard balloon dressing applied at 1023.  Patient transferred to neuro ICU in stable condition.

## 2021-10-11 NOTE — CHART NOTE - NSCHARTNOTEFT_GEN_A_CORE
Neurointerventional Surgery  Pre-Procedure Note     HPI:   5 yo F with no PMH meningioma presents today for cerebral angiogram. Patient reports that she has history of headaches which have worsened over the past few months. Patient then presented to her neurologist Dr. Victoria who ordered MRI, and patient was found to have large L frontal meningioma. Patient was then referred to neuro IR for cerebral angiogram for possible tumor embolization prior to resection. Patient has no current complaints today including headache, dizziness, weakness, numbness, tingling, N/V, SOB, chest pain.     Allergies: No Known Allergies      PAST MEDICAL & SURGICAL HISTORY:  History of cerebral meningioma      FAMILY HISTORY:  FH: diabetes mellitus (Mother)  FH: heart attack (Mother)      Physical Exam:  Constitutional: NAD    Neuro  * Mental Status:  GCS 15:  E(4), V(5), M(6).  Awake, alert, oriented to conversation.  * Cranial Nerves: Cnii-Cnxii grossly intact. PERRL, EOMI, tongue midline, no gaze deviation  * Motor: RUE 5/5, LUE 5/5, RLE 5/5, LLE 5/5  * Sensory: Sensation intact to light touch  * Reflexes: Not assessed  * Gait: Not assessed    Cardiovascular:  S1, S2 no murmurs appreciated.  Regular rate and rhythm.  Eyes: See neurologic examination with detailed examination of eyes.  ENT: No JVD, Trachea Midline.  Respiratory: Clear to auscultation.  Gastrointestinal: Soft, nontender, nondistended.  Genitourinary: [ ] Monzon, [ x ] No Monzon.   Musculoskeletal: No muscle wasting noted, (See neuorlogic assessment for full muscle strength assessment) No pretibial edema appreciated, no appreciable calf tenderness.  Skin:  Wound inspected, no redness, bleeding or drainage noted.    Hematologic / Lymph / Immunologic: No bleeding from IV sites or wounds, No lymphadenopathy, No Hives or allergic type skin lesions      [facility] SS:  DATE: 10/11/21  TIME:  1A: Level of consciousness (0-3): 0  1B: Questions (0-2): 0    1C: Commands (0-2): 0  2: Gaze (0-2): 0  3: Visual fields (0-3): 0  4: Facial palsy (0-3): 0  MOTOR:  5A: Left arm motor drift (0-4): 0  5B: Right arm motor drift (0-4): 0  6A: Left leg motor drift (0-4): 0  6B: Right leg motor drift (0-4): 0  7: Limb ataxia (0-2): 0  SENSORY:  8: Sensation (0-2): 0  SPEECH:  9: Language (0-3): 0  10: Dysarthria (0-2): 0  EXTINCTION:  11: Extinction/inattention (0-2): 0    TOTAL SCORE:     Labs:   10/5/21: WBC 5.1, Hgb 13.2, Hct 39.8, platelets 254  10/5/21: Na 140, K 4.2, Cl 101, Co2 24, BUN 11.2, Cr 0.58, glucose 115  10/5/21: PTT 33.5, INR 1.02      Assessment/Plan:   This is a 51y  year old right / left hand dominant Female  presents with headaches, found to have L frontal meningioma. Patient presents to neuro-IR for selective cerebral angiography.     Procedure, goals, risks, benefits and alternatives  were discussed with patient.  All questions were answered.  Risks include but are not limited to stroke, vessel injury, hemorrhage, and or groin hematoma.  Patient demonstrates understanding  of all risks involved with this procedure and wishes to continue.   Appropriate  consent was obtained from patient and consent is in the patient's chart. Neurointerventional Surgery  Pre-Procedure Note     HPI:   7 yo F with no PMH meningioma presents today for cerebral angiogram. Patient reports that she has history of headaches which have worsened over the past few months. Patient then presented to her neurologist Dr. Victoria who ordered MRI, and patient was found to have large L frontal meningioma. Patient was then referred to neuro IR for cerebral angiogram for possible tumor embolization prior to resection. Patient has no current complaints today including headache, dizziness, weakness, numbness, tingling, N/V, SOB, chest pain.     Allergies: No Known Allergies      PAST MEDICAL & SURGICAL HISTORY:  History of cerebral meningioma      FAMILY HISTORY:  FH: diabetes mellitus (Mother)  FH: heart attack (Mother)      Physical Exam:  Constitutional: NAD  Neuro  * Mental Status:  GCS 15:  E(4), V(5), M(6).  Awake, alert, oriented to conversation. No dysarthria or dysphagia. Able to name objects and their function.  * Cranial Nerves: Cnii-Cnxii grossly intact. PERRL, EOMI, tongue midline, no gaze deviation  * Motor: RUE 5/5, LUE 5/5, RLE 5/5, LLE 5/5, no drift or dysmetria   * Sensory: Sensation intact to light touch  * Reflexes: Not assessed  * Gait: Not assessed  Cardiovascular:  S1, S2 no murmurs appreciated.  Regular rate and rhythm.  Eyes: See neurologic examination with detailed examination of eyes.  ENT: No JVD, Trachea Midline.  Respiratory: Clear to auscultation.  Gastrointestinal: Soft, nontender, nondistended.  Genitourinary: [ ] Monzon, [ x ] No Monzon.   Musculoskeletal: No muscle wasting noted, (See neurologic assessment for full muscle strength assessment) No pretibial edema appreciated, no appreciable calf tenderness.  Skin:  Wound inspected, no redness, bleeding or drainage noted.    Hematologic / Lymph / Immunologic: No bleeding from IV sites or wounds, No lymphadenopathy, No Hives or allergic type skin lesions      [facility] SS:  DATE: 10/11/21  TIME: 0755  1A: Level of consciousness (0-3): 0  1B: Questions (0-2): 0    1C: Commands (0-2): 0  2: Gaze (0-2): 0  3: Visual fields (0-3): 0  4: Facial palsy (0-3): 0  MOTOR:  5A: Left arm motor drift (0-4): 0  5B: Right arm motor drift (0-4): 0  6A: Left leg motor drift (0-4): 0  6B: Right leg motor drift (0-4): 0  7: Limb ataxia (0-2): 0  SENSORY:  8: Sensation (0-2): 0  SPEECH:  9: Language (0-3): 0  10: Dysarthria (0-2): 0  EXTINCTION:  11: Extinction/inattention (0-2): 0    TOTAL SCORE: 0      Labs:   10/5/21: WBC 5.1, Hgb 13.2, Hct 39.8, platelets 254  10/5/21: Na 140, K 4.2, Cl 101, Co2 24, BUN 11.2, Cr 0.58, glucose 115  10/5/21: PTT 33.5, INR 1.02      Assessment/Plan:   This is a 51y  year old right hand dominant Female  presents with headaches, found to have L frontal meningioma. Patient presents to neuro-IR for selective cerebral angiography.     Procedure, goals, risks, benefits and alternatives  were discussed with patient.  All questions were answered.  Risks include but are not limited to stroke, vessel injury, hemorrhage, and or groin hematoma.  Patient demonstrates understanding  of all risks involved with this procedure and wishes to continue.   Appropriate  consent was obtained from patient and consent is in the patient's chart.

## 2021-10-12 ENCOUNTER — APPOINTMENT (OUTPATIENT)
Dept: NEUROSURGERY | Facility: HOSPITAL | Age: 51
End: 2021-10-12
Payer: COMMERCIAL

## 2021-10-12 ENCOUNTER — RESULT REVIEW (OUTPATIENT)
Age: 51
End: 2021-10-12

## 2021-10-12 LAB
A1C WITH ESTIMATED AVERAGE GLUCOSE RESULT: 5.9 % — HIGH (ref 4–5.6)
ABO RH CONFIRMATION: SIGNIFICANT CHANGE UP
ANION GAP SERPL CALC-SCNC: 9 MMOL/L — SIGNIFICANT CHANGE UP (ref 5–17)
APTT BLD: 29.7 SEC — SIGNIFICANT CHANGE UP (ref 27.5–35.5)
BUN SERPL-MCNC: 11.5 MG/DL — SIGNIFICANT CHANGE UP (ref 8–20)
CALCIUM SERPL-MCNC: 8.9 MG/DL — SIGNIFICANT CHANGE UP (ref 8.6–10.2)
CHLORIDE SERPL-SCNC: 108 MMOL/L — HIGH (ref 98–107)
CHOLEST SERPL-MCNC: 182 MG/DL — SIGNIFICANT CHANGE UP
CO2 SERPL-SCNC: 22 MMOL/L — SIGNIFICANT CHANGE UP (ref 22–29)
COVID-19 SPIKE DOMAIN AB INTERP: POSITIVE
COVID-19 SPIKE DOMAIN ANTIBODY RESULT: >250 U/ML — HIGH
CREAT SERPL-MCNC: 0.5 MG/DL — SIGNIFICANT CHANGE UP (ref 0.5–1.3)
ESTIMATED AVERAGE GLUCOSE: 123 MG/DL — HIGH (ref 68–114)
GLUCOSE SERPL-MCNC: 106 MG/DL — HIGH (ref 70–99)
HCT VFR BLD CALC: 35.6 % — SIGNIFICANT CHANGE UP (ref 34.5–45)
HDLC SERPL-MCNC: 46 MG/DL — LOW
HGB BLD-MCNC: 11.6 G/DL — SIGNIFICANT CHANGE UP (ref 11.5–15.5)
INR BLD: 1.05 RATIO — SIGNIFICANT CHANGE UP (ref 0.88–1.16)
LIPID PNL WITH DIRECT LDL SERPL: 128 MG/DL — HIGH
MAGNESIUM SERPL-MCNC: 2 MG/DL — SIGNIFICANT CHANGE UP (ref 1.6–2.6)
MCHC RBC-ENTMCNC: 27.6 PG — SIGNIFICANT CHANGE UP (ref 27–34)
MCHC RBC-ENTMCNC: 32.6 GM/DL — SIGNIFICANT CHANGE UP (ref 32–36)
MCV RBC AUTO: 84.6 FL — SIGNIFICANT CHANGE UP (ref 80–100)
NON HDL CHOLESTEROL: 136 MG/DL — HIGH
PHOSPHATE SERPL-MCNC: 3.3 MG/DL — SIGNIFICANT CHANGE UP (ref 2.4–4.7)
PLATELET # BLD AUTO: 235 K/UL — SIGNIFICANT CHANGE UP (ref 150–400)
POTASSIUM SERPL-MCNC: 4.2 MMOL/L — SIGNIFICANT CHANGE UP (ref 3.5–5.3)
POTASSIUM SERPL-SCNC: 4.2 MMOL/L — SIGNIFICANT CHANGE UP (ref 3.5–5.3)
PROTHROM AB SERPL-ACNC: 12.1 SEC — SIGNIFICANT CHANGE UP (ref 10.6–13.6)
RBC # BLD: 4.21 M/UL — SIGNIFICANT CHANGE UP (ref 3.8–5.2)
RBC # FLD: 12.2 % — SIGNIFICANT CHANGE UP (ref 10.3–14.5)
SARS-COV-2 IGG+IGM SERPL QL IA: >250 U/ML — HIGH
SARS-COV-2 IGG+IGM SERPL QL IA: POSITIVE
SODIUM SERPL-SCNC: 139 MMOL/L — SIGNIFICANT CHANGE UP (ref 135–145)
T4 AB SER-ACNC: 5.9 UG/DL — SIGNIFICANT CHANGE UP (ref 4.5–12)
TRIGL SERPL-MCNC: 42 MG/DL — SIGNIFICANT CHANGE UP
TSH SERPL-MCNC: 0.26 UIU/ML — LOW (ref 0.27–4.2)
WBC # BLD: 9.23 K/UL — SIGNIFICANT CHANGE UP (ref 3.8–10.5)
WBC # FLD AUTO: 9.23 K/UL — SIGNIFICANT CHANGE UP (ref 3.8–10.5)

## 2021-10-12 PROCEDURE — 86900 BLOOD TYPING SEROLOGIC ABO: CPT

## 2021-10-12 PROCEDURE — 61512 CRNEC TREPH EXC MNGIOMA STTL: CPT | Mod: 82,22

## 2021-10-12 PROCEDURE — 71046 X-RAY EXAM CHEST 2 VIEWS: CPT

## 2021-10-12 PROCEDURE — 85025 COMPLETE CBC W/AUTO DIFF WBC: CPT

## 2021-10-12 PROCEDURE — 87640 STAPH A DNA AMP PROBE: CPT

## 2021-10-12 PROCEDURE — 15750 NEUROVASCULAR PEDICLE FLAP: CPT | Mod: 82

## 2021-10-12 PROCEDURE — 93005 ELECTROCARDIOGRAM TRACING: CPT

## 2021-10-12 PROCEDURE — 61781 SCAN PROC CRANIAL INTRA: CPT

## 2021-10-12 PROCEDURE — 99291 CRITICAL CARE FIRST HOUR: CPT

## 2021-10-12 PROCEDURE — 86850 RBC ANTIBODY SCREEN: CPT

## 2021-10-12 PROCEDURE — 85730 THROMBOPLASTIN TIME PARTIAL: CPT

## 2021-10-12 PROCEDURE — 70450 CT HEAD/BRAIN W/O DYE: CPT | Mod: 26

## 2021-10-12 PROCEDURE — 83036 HEMOGLOBIN GLYCOSYLATED A1C: CPT

## 2021-10-12 PROCEDURE — 86901 BLOOD TYPING SEROLOGIC RH(D): CPT

## 2021-10-12 PROCEDURE — 85610 PROTHROMBIN TIME: CPT

## 2021-10-12 PROCEDURE — 88307 TISSUE EXAM BY PATHOLOGIST: CPT | Mod: 26

## 2021-10-12 PROCEDURE — 36415 COLL VENOUS BLD VENIPUNCTURE: CPT

## 2021-10-12 PROCEDURE — G0463: CPT

## 2021-10-12 PROCEDURE — 88360 TUMOR IMMUNOHISTOCHEM/MANUAL: CPT | Mod: 26

## 2021-10-12 PROCEDURE — 88342 IMHCHEM/IMCYTCHM 1ST ANTB: CPT | Mod: 26,59

## 2021-10-12 PROCEDURE — 86923 COMPATIBILITY TEST ELECTRIC: CPT

## 2021-10-12 PROCEDURE — 80048 BASIC METABOLIC PNL TOTAL CA: CPT

## 2021-10-12 PROCEDURE — 15750 NEUROVASCULAR PEDICLE FLAP: CPT

## 2021-10-12 PROCEDURE — 87641 MR-STAPH DNA AMP PROBE: CPT

## 2021-10-12 PROCEDURE — 61512 CRNEC TREPH EXC MNGIOMA STTL: CPT | Mod: 22

## 2021-10-12 PROCEDURE — 61781 SCAN PROC CRANIAL INTRA: CPT | Mod: 82

## 2021-10-12 RX ORDER — LEVETIRACETAM 250 MG/1
500 TABLET, FILM COATED ORAL EVERY 12 HOURS
Refills: 0 | Status: DISCONTINUED | OUTPATIENT
Start: 2021-10-12 | End: 2021-10-15

## 2021-10-12 RX ORDER — ACETAMINOPHEN 500 MG
750 TABLET ORAL ONCE
Refills: 0 | Status: COMPLETED | OUTPATIENT
Start: 2021-10-12 | End: 2021-10-12

## 2021-10-12 RX ORDER — HYDROMORPHONE HYDROCHLORIDE 2 MG/ML
0.5 INJECTION INTRAMUSCULAR; INTRAVENOUS; SUBCUTANEOUS
Refills: 0 | Status: DISCONTINUED | OUTPATIENT
Start: 2021-10-12 | End: 2021-10-13

## 2021-10-12 RX ORDER — LEVETIRACETAM 250 MG/1
500 TABLET, FILM COATED ORAL
Refills: 0 | Status: DISCONTINUED | OUTPATIENT
Start: 2021-10-12 | End: 2021-10-12

## 2021-10-12 RX ORDER — ONDANSETRON 8 MG/1
4 TABLET, FILM COATED ORAL EVERY 6 HOURS
Refills: 0 | Status: DISCONTINUED | OUTPATIENT
Start: 2021-10-12 | End: 2021-10-15

## 2021-10-12 RX ORDER — HYDRALAZINE HCL 50 MG
10 TABLET ORAL EVERY 4 HOURS
Refills: 0 | Status: DISCONTINUED | OUTPATIENT
Start: 2021-10-12 | End: 2021-10-13

## 2021-10-12 RX ORDER — CEFAZOLIN SODIUM 1 G
VIAL (EA) INJECTION
Refills: 0 | Status: COMPLETED | OUTPATIENT
Start: 2021-10-12 | End: 2021-10-13

## 2021-10-12 RX ORDER — FENTANYL CITRATE 50 UG/ML
25 INJECTION INTRAVENOUS ONCE
Refills: 0 | Status: DISCONTINUED | OUTPATIENT
Start: 2021-10-12 | End: 2021-10-12

## 2021-10-12 RX ORDER — SODIUM CHLORIDE 9 MG/ML
500 INJECTION INTRAMUSCULAR; INTRAVENOUS; SUBCUTANEOUS ONCE
Refills: 0 | Status: COMPLETED | OUTPATIENT
Start: 2021-10-12 | End: 2021-10-12

## 2021-10-12 RX ORDER — CEFAZOLIN SODIUM 1 G
1000 VIAL (EA) INJECTION ONCE
Refills: 0 | Status: COMPLETED | OUTPATIENT
Start: 2021-10-12 | End: 2021-10-12

## 2021-10-12 RX ORDER — CEFAZOLIN SODIUM 1 G
1000 VIAL (EA) INJECTION EVERY 8 HOURS
Refills: 0 | Status: COMPLETED | OUTPATIENT
Start: 2021-10-12 | End: 2021-10-13

## 2021-10-12 RX ORDER — SODIUM CHLORIDE 9 MG/ML
1000 INJECTION INTRAMUSCULAR; INTRAVENOUS; SUBCUTANEOUS
Refills: 0 | Status: DISCONTINUED | OUTPATIENT
Start: 2021-10-12 | End: 2021-10-13

## 2021-10-12 RX ORDER — OXYCODONE AND ACETAMINOPHEN 5; 325 MG/1; MG/1
1 TABLET ORAL EVERY 4 HOURS
Refills: 0 | Status: DISCONTINUED | OUTPATIENT
Start: 2021-10-12 | End: 2021-10-13

## 2021-10-12 RX ADMIN — SODIUM CHLORIDE 100 MILLILITER(S): 9 INJECTION INTRAMUSCULAR; INTRAVENOUS; SUBCUTANEOUS at 05:44

## 2021-10-12 RX ADMIN — Medication 100 MILLIGRAM(S): at 13:02

## 2021-10-12 RX ADMIN — FENTANYL CITRATE 25 MICROGRAM(S): 50 INJECTION INTRAVENOUS at 12:58

## 2021-10-12 RX ADMIN — LEVETIRACETAM 500 MILLIGRAM(S): 250 TABLET, FILM COATED ORAL at 18:21

## 2021-10-12 RX ADMIN — SODIUM CHLORIDE 500 MILLILITER(S): 9 INJECTION INTRAMUSCULAR; INTRAVENOUS; SUBCUTANEOUS at 05:44

## 2021-10-12 RX ADMIN — SODIUM CHLORIDE 100 MILLILITER(S): 9 INJECTION INTRAMUSCULAR; INTRAVENOUS; SUBCUTANEOUS at 03:35

## 2021-10-12 RX ADMIN — FENTANYL CITRATE 25 MICROGRAM(S): 50 INJECTION INTRAVENOUS at 12:43

## 2021-10-12 RX ADMIN — SODIUM CHLORIDE 3 MILLILITER(S): 9 INJECTION INTRAMUSCULAR; INTRAVENOUS; SUBCUTANEOUS at 05:45

## 2021-10-12 RX ADMIN — CHLORHEXIDINE GLUCONATE 1 APPLICATION(S): 213 SOLUTION TOPICAL at 05:43

## 2021-10-12 RX ADMIN — Medication 300 MILLIGRAM(S): at 15:03

## 2021-10-12 RX ADMIN — SODIUM CHLORIDE 75 MILLILITER(S): 9 INJECTION INTRAMUSCULAR; INTRAVENOUS; SUBCUTANEOUS at 13:14

## 2021-10-12 RX ADMIN — ONDANSETRON 4 MILLIGRAM(S): 8 TABLET, FILM COATED ORAL at 05:43

## 2021-10-12 RX ADMIN — Medication 750 MILLIGRAM(S): at 15:18

## 2021-10-12 RX ADMIN — SODIUM CHLORIDE 100 MILLILITER(S): 9 INJECTION INTRAMUSCULAR; INTRAVENOUS; SUBCUTANEOUS at 03:32

## 2021-10-12 NOTE — PROGRESS NOTE ADULT - SUBJECTIVE AND OBJECTIVE BOX
Chief complaint:   Patient is a 51y old  Female who presents with a chief complaint of   HPI:        24hr EVENTS:      ROS: [ ]  Unable to assess due to mental status   All other systems negative    -----------------------------------------------------------------------------------------------------------------------------------------------------------------------------------  ICU Vital Signs Last 24 Hrs  T(C): 36.9 (12 Oct 2021 07:36), Max: 37.1 (12 Oct 2021 04:00)  T(F): 98.4 (12 Oct 2021 07:36), Max: 98.7 (12 Oct 2021 04:00)  HR: 87 (12 Oct 2021 07:00) (53 - 104)  BP: 115/64 (12 Oct 2021 07:00) (81/46 - 125/53)  BP(mean): 79 (12 Oct 2021 07:00) (53 - 84)  ABP: --  ABP(mean): --  RR: 16 (12 Oct 2021 07:00) (13 - 27)  SpO2: 100% (12 Oct 2021 07:00) (100% - 100%)      I&O's Summary    11 Oct 2021 07:01  -  12 Oct 2021 07:00  --------------------------------------------------------  IN: 2320 mL / OUT: 450 mL / NET: 1870 mL        MEDICATIONS  (STANDING):      RESPIRATORY:        IMAGING:   Recent imaging studies were reviewed.    LAB RESULTS:                          11.6   9.23  )-----------( 235      ( 12 Oct 2021 04:28 )             35.6       PT/INR - ( 12 Oct 2021 04:28 )   PT: 12.1 sec;   INR: 1.05 ratio         PTT - ( 12 Oct 2021 04:28 )  PTT:29.7 sec    10-12    139  |  108<H>  |  11.5  ----------------------------<  106<H>  4.2   |  22.0  |  0.50    Ca    8.9      12 Oct 2021 04:28  Phos  3.3     10-12  Mg     2.0     10-12        -----------------------------------------------------------------------------------------------------------------------------------------------------------------------------------    PHYSICAL EXAM:  General: Calm, laying in bed  HEENT: MMM  Neuro:  -Mental status- No acute distress, AOx3, conversational, following commands  -CN- PERRL 3mm, EOMI, tongue midline, face symmetric  -Motor- full strength in all ext  -Sensation- intact to LT   -Coordination- no dysmetria noted    CV: RRR  Pulm: Clear to auscultation  Abd: Soft, nontender, nondistended  Ext: No edema  Skin: warm, dry     Chief complaint:   Patient is a 51y old  Female who presents with a chief complaint of meningioma    24hr EVENTS: POD 0 meningioma resection      ROS: mild headache  All other systems negative    -----------------------------------------------------------------------------------------------------------------------------------------------------------------------------------  ICU Vital Signs Last 24 Hrs  T(C): 36.9 (12 Oct 2021 07:36), Max: 37.1 (12 Oct 2021 04:00)  T(F): 98.4 (12 Oct 2021 07:36), Max: 98.7 (12 Oct 2021 04:00)  HR: 87 (12 Oct 2021 07:00) (53 - 104)  BP: 115/64 (12 Oct 2021 07:00) (81/46 - 125/53)  BP(mean): 79 (12 Oct 2021 07:00) (53 - 84)  ABP: --  ABP(mean): --  RR: 16 (12 Oct 2021 07:00) (13 - 27)  SpO2: 100% (12 Oct 2021 07:00) (100% - 100%)      I&O's Summary    11 Oct 2021 07:01  -  12 Oct 2021 07:00  --------------------------------------------------------  IN: 2320 mL / OUT: 450 mL / NET: 1870 mL        MEDICATIONS  (STANDING):      RESPIRATORY:        IMAGING:   Recent imaging studies were reviewed.    LAB RESULTS:                          11.6   9.23  )-----------( 235      ( 12 Oct 2021 04:28 )             35.6       PT/INR - ( 12 Oct 2021 04:28 )   PT: 12.1 sec;   INR: 1.05 ratio         PTT - ( 12 Oct 2021 04:28 )  PTT:29.7 sec    10-12    139  |  108<H>  |  11.5  ----------------------------<  106<H>  4.2   |  22.0  |  0.50    Ca    8.9      12 Oct 2021 04:28  Phos  3.3     10-12  Mg     2.0     10-12        -----------------------------------------------------------------------------------------------------------------------------------------------------------------------------------    PHYSICAL EXAM:  General: Calm, laying in bed, drowsy  HEENT: MMM  Neuro:  -Mental status- No acute distress, AOx3, following commands  -CN- PERRL 3mm, EOMI, tongue midline, face symmetric  -Motor- full strength in all ext  -Sensation- intact to LT   -Coordination- no dysmetria noted    CV: RRR  Pulm: Clear to auscultation  Abd: Soft, nontender, nondistended  Ext: No edema  Skin: warm, dry

## 2021-10-12 NOTE — CHART NOTE - NSCHARTNOTEFT_GEN_A_CORE
Neurosurgery Post OP Note     50 y/o female s/p Left craniotomy for tumor resection   Patient c/o mild headache relieved with meds   No N/V    ICU Vital Signs Last 24 Hrs  T(C): 36.7 (12 Oct 2021 12:20), Max: 37.1 (12 Oct 2021 04:00)  T(F): 98 (12 Oct 2021 12:20), Max: 98.7 (12 Oct 2021 04:00)  HR: 78 (12 Oct 2021 15:00) (53 - 108)  BP: 123/76 (12 Oct 2021 15:00) (81/46 - 125/53)  BP(mean): 90 (12 Oct 2021 15:00) (53 - 91)  ABP: 137/74 (12 Oct 2021 15:00) (128/68 - 146/77)  ABP(mean): 102 (12 Oct 2021 15:00) (92 - 102)  RR: 21 (12 Oct 2021 15:00) (12 - 27)  SpO2: 100% (12 Oct 2021 15:00) (100% - 100%)                          11.6   9.23  )-----------( 235      ( 12 Oct 2021 04:28 )             35.6   10-12    139  |  108<H>  |  11.5  ----------------------------<  106<H>  4.2   |  22.0  |  0.50    Ca    8.9      12 Oct 2021 04:28  Phos  3.3     10-12  Mg     2.0     10-12    Sleeping but easily awoken   Opens eyes to voice   Oriented x3; speech fluent   HERRMANN x4 with 5/5 strength   Dressing C/D/I; drain with serosanguinous fluid    POD#0 L crani for tumor resection  - continue LEOLA  - CTH today   - MRI +/- in am   - Keppra 500 BID x 7days   - OOB/PT  - continue Q1 neurochecks   - SBP <140

## 2021-10-12 NOTE — BRIEF OPERATIVE NOTE - NSICDXBRIEFPROCEDURE_GEN_ALL_CORE_FT
PROCEDURES:  Craniotomy for resection of tumor of left side of brain 12-Oct-2021 12:04:41  Violeta Gomes

## 2021-10-12 NOTE — PROGRESS NOTE ADULT - SUBJECTIVE AND OBJECTIVE BOX
HPI: 51 year old Right Handed female present today for PSt.   She has a large left frontal meningioma. She reports history of headaches which are intermittently, but have worsened recently. She consulted with Dr. Guilherme Saravia who ordered brain MRI (8/7/2021) and showed a 3.0 cm extra axial left frontal mass representing meningioma with mass edema. An MRA on 8/31/2021 was done as well.  She had an MRI approximately 8 years ago which showed this lesion and as stated by the patient, it was the size of a "kidney bean". The headaches have become worsen as the mass grew. There is edema associated the the tumor and mass effect.   She is now schedule for cerebral angiogram before surgery to resect the meningioma.      Interval History:  24 Hour Events: No acute events. POD 1 for tumor embolization with neuro IR team. Right groin remains stable without bleeding or groin hematoma.    1. Patient's Neurologic Exam unchanged.    2. Patient's Hemodynamic's maintained without drips.    3. Patient's Respiratory status is: adequate    4. Patient is pending: Patient is pending further neurologic recovery, further neurologic, respiratory, and hemodynamic monitoring & management in the ICU.     5. Dispo: ICU for now, downgrade when clinically stable.         Review of Systems:  Constitutional:  Pain well controlled with PRNs, no fevers, chills, or new weakness  Eyes: No double vision, no change in visual acuity, no blurry vision, no occular discharge  Neurologic: No new weakness, no seizure reported, headaches well controlled with PRN medications  Ears, nose, mouth throat:  No ottorrhea. No change in hearing, No anosmia, No oral lesions, No sore throat  Cardiovascular: No palpitations, no chest pain, no nocturnal or positional dyspnea.  Respiratory: No shortness of breath, No Cough  Gastrointestinal: No change in bowel habits, no change in appetite  Genitourinary: No Frequency, No Dysuria, no Hematuria  Musculoskeletal: No muscle wasting, No new weakness  Psych: No changes in mood, Denies drug use, Denies history of psyciatric illness  Integumentary: Denies new skin lesions  Endocrine: Denies history of DM, denies history of thyroid disease, No heat or cold intolerance  Heme/Lymph: No use of antiplatelet/anticoagulant  Allergic / Immune: No active allergies unless otherwise specified in medical chart    All other systems reviewed and are negative      Physical Exam:  Constitutional: NAD    Neuro  * Mental Status:  GCS 15:  E(4), V(5), M(6).  Awake, alert, oriented to conversation.  * Cranial Nerves: Cnii-Cnxii grossly intact. PERRL, EOMI, tongue midline, no gaze deviation  * Motor: RUE 5/5, LUE 5/5, RLE 5/5, LLE 5/5  * Sensory: Sensation intact to light touch  * Reflexes: Not assessed  * Gait: Not assessed    Cardiovascular:  S1, S2 no murmurs appreciated.  Regular rate and rhythm.  Eyes: See neurologic examination with detailed examination of eyes.  ENT: No JVD, Trachea Midline.  Respiratory: Clear to auscultation.  Gastrointestinal: Soft, nontender, nondistended.  Genitourinary: [ ] Monzon, [ x ] No Monzon.   Musculoskeletal: No muscle wasting noted, (See neurologic assessment for full muscle strength assessment) No pretibial edema appreciated, no appreciable calf tenderness.  Skin:  Wound inspected, no redness, bleeding or drainage noted.    Hematologic / Lymph / Immunologic: No bleeding from IV sites or wounds, No lymphadenopathy, No Hives or allergic type skin lesions      Vitals:  Vital Signs Last 24 Hrs  T(C): 36.6 (12 Oct 2021 00:04), Max: 36.7 (11 Oct 2021 12:20)  T(F): 97.9 (12 Oct 2021 00:04), Max: 98 (11 Oct 2021 12:20)  HR: 64 (12 Oct 2021 00:00) (53 - 89)  BP: 96/56 (12 Oct 2021 00:00) (81/46 - 126/59)  BP(mean): 68 (12 Oct 2021 00:00) (57 - 84)  RR: 14 (12 Oct 2021 00:00) (14 - 20)  SpO2: 100% (12 Oct 2021 00:00) (100% - 100%)    I&O:  I&O's Summary    11 Oct 2021 07:01  -  12 Oct 2021 00:51  --------------------------------------------------------  IN: 1120 mL / OUT: 0 mL / NET: 1120 mL        Labs & Radiology:                                    CAPILLARY BLOOD GLUCOSE          Neurosurgery Imaging:      Medications:  MEDICATIONS  (STANDING):  chlorhexidine 4% Liquid 1 Application(s) Topical every 12 hours  ondansetron Injectable 4 milliGRAM(s) IV Push every 6 hours  sodium chloride 0.9% lock flush 3 milliLiter(s) IV Push every 8 hours  sodium chloride 0.9%. 1000 milliLiter(s) (100 mL/Hr) IV Continuous <Continuous>    MEDICATIONS  (PRN):  acetaminophen   Tablet .. 650 milliGRAM(s) Oral every 6 hours PRN Mild Pain (1 - 3)  hydrALAZINE Injectable 10 milliGRAM(s) IV Push every 2 hours PRN SBP >160      Assessment: 51F with hx HA found with large left frontal meningoma POD 1 for tumor embolization with Dr. Owens preriley for meningioma resection.     PLAN:  Continue Neuro Checks q1 hour  Maintain BP within desired range 100-160  NPO today for Preop for meningioma resection  Hold AC/AP at this time  Medical care per Neuro ICU  No acute neurosurgical intervention is indicated at this time, will continue to follow      I attest that >50% of this total time was spent counseling, and or coordinating care via review of relevant history, performing my own independent physical examination, personal review of images discussion with the multidisciplinary team and any consultants involved in this patient's care.  This management was performed directly at the patient's bedside or within the Neurocritical Care Unit.

## 2021-10-12 NOTE — CHART NOTE - NSCHARTNOTEFT_GEN_A_CORE
R groin check remains c/d/i without bleeding or groin hematoma. POD #1 tumor embolization. Groin dressing removed and left RAJEEV. No ecchymosis, no palpable hematoma. Patient denies complaints of back pain, abdominal pain, dizziness. b/l LE remain warm to touch and palpable distal pulses. Neurologic exam remains nonfocal. Patient preop for OR today for meningioma resection. Please reconsult PRN.

## 2021-10-13 LAB
ANION GAP SERPL CALC-SCNC: 13 MMOL/L — SIGNIFICANT CHANGE UP (ref 5–17)
BUN SERPL-MCNC: 8.3 MG/DL — SIGNIFICANT CHANGE UP (ref 8–20)
CALCIUM SERPL-MCNC: 8.8 MG/DL — SIGNIFICANT CHANGE UP (ref 8.6–10.2)
CHLORIDE SERPL-SCNC: 103 MMOL/L — SIGNIFICANT CHANGE UP (ref 98–107)
CO2 SERPL-SCNC: 22 MMOL/L — SIGNIFICANT CHANGE UP (ref 22–29)
CREAT SERPL-MCNC: 0.53 MG/DL — SIGNIFICANT CHANGE UP (ref 0.5–1.3)
GLUCOSE SERPL-MCNC: 143 MG/DL — HIGH (ref 70–99)
HCT VFR BLD CALC: 28.7 % — LOW (ref 34.5–45)
HGB BLD-MCNC: 9.7 G/DL — LOW (ref 11.5–15.5)
MAGNESIUM SERPL-MCNC: 1.9 MG/DL — SIGNIFICANT CHANGE UP (ref 1.6–2.6)
MCHC RBC-ENTMCNC: 28 PG — SIGNIFICANT CHANGE UP (ref 27–34)
MCHC RBC-ENTMCNC: 33.8 GM/DL — SIGNIFICANT CHANGE UP (ref 32–36)
MCV RBC AUTO: 82.7 FL — SIGNIFICANT CHANGE UP (ref 80–100)
PHOSPHATE SERPL-MCNC: 3.5 MG/DL — SIGNIFICANT CHANGE UP (ref 2.4–4.7)
PLATELET # BLD AUTO: 212 K/UL — SIGNIFICANT CHANGE UP (ref 150–400)
POTASSIUM SERPL-MCNC: 3.8 MMOL/L — SIGNIFICANT CHANGE UP (ref 3.5–5.3)
POTASSIUM SERPL-SCNC: 3.8 MMOL/L — SIGNIFICANT CHANGE UP (ref 3.5–5.3)
RBC # BLD: 3.47 M/UL — LOW (ref 3.8–5.2)
RBC # FLD: 12.4 % — SIGNIFICANT CHANGE UP (ref 10.3–14.5)
SODIUM SERPL-SCNC: 138 MMOL/L — SIGNIFICANT CHANGE UP (ref 135–145)
T4 AB SER-ACNC: 5.3 UG/DL — SIGNIFICANT CHANGE UP (ref 4.5–12)
WBC # BLD: 10.07 K/UL — SIGNIFICANT CHANGE UP (ref 3.8–10.5)
WBC # FLD AUTO: 10.07 K/UL — SIGNIFICANT CHANGE UP (ref 3.8–10.5)

## 2021-10-13 PROCEDURE — 99233 SBSQ HOSP IP/OBS HIGH 50: CPT

## 2021-10-13 RX ORDER — POTASSIUM CHLORIDE 20 MEQ
40 PACKET (EA) ORAL ONCE
Refills: 0 | Status: COMPLETED | OUTPATIENT
Start: 2021-10-13 | End: 2021-10-13

## 2021-10-13 RX ORDER — HYDRALAZINE HCL 50 MG
10 TABLET ORAL EVERY 4 HOURS
Refills: 0 | Status: DISCONTINUED | OUTPATIENT
Start: 2021-10-13 | End: 2021-10-15

## 2021-10-13 RX ORDER — ACETAMINOPHEN 500 MG
650 TABLET ORAL EVERY 6 HOURS
Refills: 0 | Status: DISCONTINUED | OUTPATIENT
Start: 2021-10-13 | End: 2021-10-15

## 2021-10-13 RX ORDER — ACETAMINOPHEN 500 MG
650 TABLET ORAL EVERY 6 HOURS
Refills: 0 | Status: DISCONTINUED | OUTPATIENT
Start: 2021-10-13 | End: 2021-10-13

## 2021-10-13 RX ORDER — SENNA PLUS 8.6 MG/1
2 TABLET ORAL AT BEDTIME
Refills: 0 | Status: DISCONTINUED | OUTPATIENT
Start: 2021-10-13 | End: 2021-10-15

## 2021-10-13 RX ORDER — MAGNESIUM SULFATE 500 MG/ML
1 VIAL (ML) INJECTION ONCE
Refills: 0 | Status: COMPLETED | OUTPATIENT
Start: 2021-10-13 | End: 2021-10-13

## 2021-10-13 RX ORDER — OXYCODONE HYDROCHLORIDE 5 MG/1
10 TABLET ORAL EVERY 4 HOURS
Refills: 0 | Status: DISCONTINUED | OUTPATIENT
Start: 2021-10-13 | End: 2021-10-15

## 2021-10-13 RX ORDER — OXYCODONE HYDROCHLORIDE 5 MG/1
5 TABLET ORAL EVERY 4 HOURS
Refills: 0 | Status: DISCONTINUED | OUTPATIENT
Start: 2021-10-13 | End: 2021-10-15

## 2021-10-13 RX ADMIN — Medication 100 MILLIGRAM(S): at 01:42

## 2021-10-13 RX ADMIN — Medication 100 MILLIGRAM(S): at 06:01

## 2021-10-13 RX ADMIN — Medication 100 MILLIGRAM(S): at 14:20

## 2021-10-13 RX ADMIN — Medication 40 MILLIEQUIVALENT(S): at 06:51

## 2021-10-13 RX ADMIN — Medication 100 GRAM(S): at 06:49

## 2021-10-13 RX ADMIN — LEVETIRACETAM 500 MILLIGRAM(S): 250 TABLET, FILM COATED ORAL at 17:47

## 2021-10-13 RX ADMIN — ONDANSETRON 4 MILLIGRAM(S): 8 TABLET, FILM COATED ORAL at 11:23

## 2021-10-13 RX ADMIN — LEVETIRACETAM 500 MILLIGRAM(S): 250 TABLET, FILM COATED ORAL at 06:01

## 2021-10-13 NOTE — OCCUPATIONAL THERAPY INITIAL EVALUATION ADULT - DIAGNOSIS, OT EVAL
Left frontal meningioma; s/p Angiogram and Tumor embolization on 10/11/21; 10/12/21 Left craniotomy for resection of tumor

## 2021-10-13 NOTE — DIETITIAN INITIAL EVALUATION ADULT. - PERTINENT LABORATORY DATA
10-13 Na138 mmol/L Glu 143 mg/dL<H> K+ 3.8 mmol/L Cr  0.53 mg/dL BUN 8.3 mg/dL Phos 3.5 mg/dL Alb n/a   PAB n/a HgA1c 5.9%

## 2021-10-13 NOTE — DIETITIAN INITIAL EVALUATION ADULT. - OTHER INFO
51 year old female with PMH headaches, found to have L frontal meningioma, underwent tumor embolization. Pt lethargic at time of interview, unable to fully participate in interview. Decreased appetite at this time noted. Breakfast tray observed untouched at bedside as pt has been sleeping. HgA1c 5.9%. RD to follow up with full subjective interview as feasible.

## 2021-10-13 NOTE — PROGRESS NOTE ADULT - SUBJECTIVE AND OBJECTIVE BOX
HPI:  51f who presents for elective treatment of meningoma.    Interval History:  24 Hour Events: POD#1 s/p left frontal craniotomy for resection of meningioma following cerebral angio for embolization    1. Patient's Neurologic Exam unchanged.    2. Patient's Hemodynamic's maintained without drips.    3. Patient's Respiratory status is: adequate    4. Patient is pending: Patient is pending further neurologic recovery, further neurologic, respiratory, and hemodynamic monitoring & management in the ICU.     5. Dispo: ICU for now, downgrade when clinically stable.         Physical Exam:  Constitutional: NAD    Neuro  * Mental Status:  GCS 15:  E(4), V(5), M(6).  Awake, alert, oriented to conversation.  * Cranial Nerves: Cnii-Cnxii grossly intact. PERRL, EOMI, tongue midline, no gaze deviation  * Motor: RUE 5/5, LUE 5/5, RLE 5/5, LLE 5/5  * Sensory: Sensation intact to light touch  * Reflexes: Not assessed  * Gait: Not assessed    Vitals:  Vital Signs Last 24 Hrs  T(C): 36.8 (12 Oct 2021 17:34), Max: 37.1 (12 Oct 2021 04:00)  T(F): 98.2 (12 Oct 2021 17:34), Max: 98.7 (12 Oct 2021 04:00)  HR: 69 (13 Oct 2021 00:00) (53 - 108)  BP: 94/58 (13 Oct 2021 00:00) (85/40 - 123/80)  BP(mean): 69 (13 Oct 2021 00:00) (53 - 94)  RR: 17 (13 Oct 2021 00:00) (12 - 21)  SpO2: 100% (13 Oct 2021 00:00) (100% - 100%)    I&O:  I&O's Summary    11 Oct 2021 07:01  -  12 Oct 2021 07:00  --------------------------------------------------------  IN: 2320 mL / OUT: 450 mL / NET: 1870 mL    12 Oct 2021 07:01  -  13 Oct 2021 01:01  --------------------------------------------------------  IN: 1185 mL / OUT: 1330 mL / NET: -145 mL        Labs & Radiology:                        11.6   9.23  )-----------( 235      ( 12 Oct 2021 04:28 )             35.6       10-12    139  |  108<H>  |  11.5  ----------------------------<  106<H>  4.2   |  22.0  |  0.50    Ca    8.9      12 Oct 2021 04:28  Phos  3.3     10-12  Mg     2.0     10-12                  PT/INR - ( 12 Oct 2021 04:28 )   PT: 12.1 sec;   INR: 1.05 ratio         PTT - ( 12 Oct 2021 04:28 )  PTT:29.7 sec            CAPILLARY BLOOD GLUCOSE          Neurosurgery Imaging:      Medications:  MEDICATIONS  (STANDING):  ceFAZolin   IVPB 1000 milliGRAM(s) IV Intermittent every 8 hours  ceFAZolin   IVPB      levETIRAcetam 500 milliGRAM(s) Oral every 12 hours  sodium chloride 0.9%. 1000 milliLiter(s) (75 mL/Hr) IV Continuous <Continuous>    MEDICATIONS  (PRN):  hydrALAZINE 10 milliGRAM(s) Oral every 4 hours PRN SBP >140  HYDROmorphone  Injectable 0.5 milliGRAM(s) SubCutaneous every 3 hours PRN Severe Pain (7 - 10)  ondansetron Injectable 4 milliGRAM(s) IV Push every 6 hours PRN Nausea and/or Vomiting  oxycodone    5 mG/acetaminophen 325 mG 1 Tablet(s) Oral every 4 hours PRN Moderate Pain (4 - 6)      Assessment:  51f who present for elective resection of left frontal menigioma. POD#1.  Continue Neuro Checks  Maintain BP within desired range  Continue diet  Medical care per Neuro ICU  No acute neurosurgical intervention is indicated at this time, will continue to follow

## 2021-10-13 NOTE — CHART NOTE - NSCHARTNOTEFT_GEN_A_CORE
Hospital Course: 51F with hx of headaches that have been worsening over the past few months presented to her neurologist Dr. Victoria who ordered an MRI for evaluation. MRI was consistent with L frontal meningioma and was referred to neuro IR for cerebral angiogram. Angiogram was performed on 10/11 and an embolization was done. Patient went for a left sided craniotomy for tumor resection on 10/12 and is now post op day 1. Patient has been neurologically intact pre and post operatively.             Past Medical History, Family History, Social History:  PAST MEDICAL & SURGICAL HISTORY:  History of cerebral meningioma        FAMILY HISTORY:  FH: diabetes mellitus (Mother)    FH: heart attack (Mother)        Social History:      Interval History:  24 Hour Events: No acute events.    1. Patient's Neurologic Exam unchanged.    2. Patient's Hemodynamic's maintained without drips.    3. Patient's Respiratory status is: adequate    4. Patient is pending: Patient is pending further neurologic recovery, further neurologic, respiratory, and hemodynamic monitoring & management in the ICU.     5. Dispo: ICU for now, downgrade when clinically stable.         Review of Systems:  Review of Systems is Limited due to patient's neurologic status.    Constitutional:  Pain well controlled with PRNs, no fevers, chills, or new weakness  Eyes: No double vision, no change in visual acuity, no blurry vision, no occular discharge  Neurologic: No new weakness, no seizure reported, headaches well controlled with PRN medications  Ears, nose, mouth throat:  No ottorrhea. No change in hearing, No anosmia, No oral lesions, No sore throat  Cardiovascular: No palpitations, no chest pain, no nocturnal or positional dyspnea.  Respiratory: No shortness of breath, No Cough  Gastrointestinal: No change in bowel habits, no change in appetite  Genitourinary: No Frequency, No Dysuria, no Hematuria  Musculoskeletal: No muscle wasting, No new weakness  Psych: No changes in mood, Denies drug use, Denies history of psyciatric illness  Integumentary: Denies new skin lesions  Endocrine: Denies history of DM, denies history of thyroid disease, No heat or cold intolerance  Heme/Lymph: No use of antiplatelet/anticoagulant  Allergic / Immune: No active allergies unless otherwise specified in medical chart    All other systems reviewed and are negative      Physical Exam:  Constitutional: NAD    Neuro  * Mental Status:  GCS 15:  E(4), V(5), M(6).  Awake, alert, oriented to conversation.  * Cranial Nerves: Cnii-Cnxii grossly intact. PERRL, EOMI, tongue midline, no gaze deviation  * Motor: RUE 5/5, LUE 5/5, RLE 5/5, LLE 5/5  * Sensory: Sensation intact to light touch  * Reflexes: Not assessed  * Gait: Not assessed    Cardiovascular:  S1, S2 no murmurs appreciated.  Regular rate and rhythm.  Eyes: See neurologic examination with detailed examination of eyes.  ENT: No JVD, Trachea Midline.  Respiratory: Clear to auscultation.  Gastrointestinal: Soft, nontender, nondistended.  Genitourinary: [ ] Monzon, [ x ] No Monzon.   Musculoskeletal: No muscle wasting noted, (See neuorlogic assessment for full muscle strength assessment) No pretibial edema appreciated, no appreciable calf tenderness.  Skin:  Wound inspected, no redness, bleeding or drainage noted.    Hematologic / Lymph / Immunologic: No bleeding from IV sites or wounds, No lymphadenopathy, No Hives or allergic type skin lesions      Vitals:  Vital Signs Last 24 Hrs  T(C): 37 (13 Oct 2021 07:45), Max: 37 (13 Oct 2021 07:45)  T(F): 98.6 (13 Oct 2021 07:45), Max: 98.6 (13 Oct 2021 07:45)  HR: 100 (13 Oct 2021 09:00) (53 - 108)  BP: 100/71 (13 Oct 2021 09:00) (91/58 - 123/80)  BP(mean): 81 (13 Oct 2021 09:00) (69 - 94)  RR: 21 (13 Oct 2021 09:00) (11 - 21)  SpO2: 100% (13 Oct 2021 09:00) (100% - 100%)    I&O:  I&O's Summary    12 Oct 2021 07:01  -  13 Oct 2021 07:00  --------------------------------------------------------  IN: 2010 mL / OUT: 1850 mL / NET: 160 mL    13 Oct 2021 07:01  -  13 Oct 2021 10:00  --------------------------------------------------------  IN: 150 mL / OUT: 445 mL / NET: -295 mL        Labs & Radiology:                        9.7    10.07 )-----------( 212      ( 13 Oct 2021 04:08 )             28.7       10-13    138  |  103  |  8.3  ----------------------------<  143<H>  3.8   |  22.0  |  0.53    Ca    8.8      13 Oct 2021 04:08  Phos  3.5     10-13  Mg     1.9     10-13                  PT/INR - ( 12 Oct 2021 04:28 )   PT: 12.1 sec;   INR: 1.05 ratio         PTT - ( 12 Oct 2021 04:28 )  PTT:29.7 sec            CAPILLARY BLOOD GLUCOSE          Neurosurgery Imaging:    I attest that all recent neurosurgical imaging was personally reviewed    Medications:  MEDICATIONS  (STANDING):  ceFAZolin   IVPB 1000 milliGRAM(s) IV Intermittent every 8 hours  ceFAZolin   IVPB      levETIRAcetam 500 milliGRAM(s) Oral every 12 hours  sodium chloride 0.9%. 1000 milliLiter(s) (75 mL/Hr) IV Continuous <Continuous>    MEDICATIONS  (PRN):  acetaminophen   Tablet .. 650 milliGRAM(s) Oral every 6 hours PRN Temp greater or equal to 38C (100.4F), Mild Pain (1 - 3)  hydrALAZINE 10 milliGRAM(s) Oral every 4 hours PRN SBP >140  ondansetron Injectable 4 milliGRAM(s) IV Push every 6 hours PRN Nausea and/or Vomiting  oxycodone    5 mG/acetaminophen 325 mG 1 Tablet(s) Oral every 4 hours PRN Moderate Pain (4 - 6)      Assessment:  HPI:      Neuro:  	Q1 hour Neuro checks, Q1 hour Vitals  	HOB 30 degrees, Neck midline position  	Maintain normothermia, PO acetaminophen for temp>38 C or pain          EVD set at [---] cm H2O monitor and record the output, Maintin ICP<22          Nimodipine for prevention of vasospasm total 21 days  	Neurosurgical Imaging Reviewed  	EEG  	Continue AED:  	Monitor wound  	Steroids:  	Pain management & Sedation:  	Turn and Position Q2  /  Activity ad mary, with assistance  	  CV:  	SBP Goal<160  	BP regimen:  	Access: Central line /Midline catheter /PICC  	Arterial line    Pulm:  	  	Supplemental O2 PRN to maintain Spo2>92%  	Chest PT, OOB, Pulmonary Toilet    GI:  	Nutrition:  	GI prophylaxis  	Bowel regimen  	  Gu:  	Monzon / Voiding / Condom Cath / Pure-wick  	I&O Q1 hour  	Monitor Electrolytes & Renal Function    Heme:  	Monitor H&H  	Antiplatelet / Anticoagulation / ARU / PRU  	Chemical DVT prophylaxis:   		* Lovenox SQ  		* Chemical DVT prophylaxis is contraindicated due to risk of bleeding  	Mechanical DVT Prophylaxis: Maintain B/L LE sequential compression devices  	  ID:  	Monitor WBC and Temperature  	Antibiotic Regimen:  		* Ancef Postoperatively: "Drain Prophylaxis" as per neurosurgeon  	Follow cultures: Pending [  ]   Not applicable [x]  		    Endo  	Monitor BGL, maintain <180  	MIREYA   		100-150 no correction  		150-200  2units  		200-250  4units  		250-300	 6units  		300-350  8units  		350-400  10units  		400+	12units         I have spent a total of [____] mins of nonconsecutive critical care time managing this patient with[ ______, ______, and ______].  This included review of relevant history, clinical examination, review of data and images, discussion of treatment with the multidisciplinary team and any consultants involved in this patient’s care as well as family discussion.     I affirm that this patient is critically ill and at high risk for sudden, fatal deterioration due to one or more of the above stated active issues. I managed/supervised life or organ support interventions that required frequent assessment. This time does not include bedside procedures that are documented separately. Hospital Course: 51F with hx of headaches that have been worsening over the past few months presented to her neurologist Dr. Victoria who ordered an MRI for evaluation. MRI was consistent with L frontal meningioma and was referred to neuro IR for cerebral angiogram. Angiogram was performed on 10/11 and an embolization was done. Patient went for a left sided craniotomy for tumor resection on 10/12 and is now post op day 1. Craniotomy was uncomplicated and patient has been neurologically intact pre and post operatively.       PAST MEDICAL & SURGICAL HISTORY:  History of cerebral meningioma        FAMILY HISTORY:  FH: diabetes mellitus (Mother)  FH: heart attack (Mother)      Interval History:  24 Hour Events: No acute events    1. Patient's Neurologic Exam unchanged    2. Patient's Hemodynamic's maintained without drips    3. Patient's Respiratory status is: adequate    4. Patient is pending: Patient is pending further post operative recovery    5. Dispo: Patient is stable for downgrade to stepdown        Physical Exam:  Constitutional: NAD    Neuro  * Mental Status:  Awake, alert, oriented to conversation  * Cranial Nerves: Cnii-Cnxii grossly intact. PERRL, EOMI, tongue midline, no gaze deviation  * Motor: RUE 5/5, LUE 5/5, RLE 5/5, LLE 5/5  * Sensory: Sensation intact to light touch      Cardiovascular:  RRR  Eyes: See neurologic examination with detailed examination of eyes.  ENT: No JVD, Trachea Midline.  Respiratory:  Symmetric chest rise, non-labored breathing   Gastrointestinal: Soft, nontender, nondistended.  Genitourinary:  [ x ] No Garay.   Musculoskeletal: No muscle wasting noted, (See neuorlogic assessment for full muscle strength assessment) No pretibial edema appreciated, no appreciable calf tenderness.  Skin:  Surgical dressing CDI       Vital Signs Last 24 Hrs  T(C): 37 (13 Oct 2021 07:45), Max: 37 (13 Oct 2021 07:45)  T(F): 98.6 (13 Oct 2021 07:45), Max: 98.6 (13 Oct 2021 07:45)  HR: 100 (13 Oct 2021 09:00) (53 - 108)  BP: 100/71 (13 Oct 2021 09:00) (91/58 - 123/80)  BP(mean): 81 (13 Oct 2021 09:00) (69 - 94)  RR: 21 (13 Oct 2021 09:00) (11 - 21)  SpO2: 100% (13 Oct 2021 09:00) (100% - 100%)    I&O:  I&O's Summary    12 Oct 2021 07:01  -  13 Oct 2021 07:00  --------------------------------------------------------  IN: 2010 mL / OUT: 1850 mL / NET: 160 mL    13 Oct 2021 07:01  -  13 Oct 2021 10:00  --------------------------------------------------------  IN: 150 mL / OUT: 445 mL / NET: -295 mL        Labs & Radiology:                        9.7    10.07 )-----------( 212      ( 13 Oct 2021 04:08 )             28.7       10-13    138  |  103  |  8.3  ----------------------------<  143<H>  3.8   |  22.0  |  0.53    Ca    8.8      13 Oct 2021 04:08  Phos  3.5     10-13  Mg     1.9     10-13      PT/INR - ( 12 Oct 2021 04:28 )   PT: 12.1 sec;   INR: 1.05 ratio    PTT - ( 12 Oct 2021 04:28 )  PTT:29.7 sec      Neurosurgery Imaging:  < from: CT Head No Cont (10.12.21 @ 17:07) >    EXAM:  CT BRAIN                          PROCEDURE DATE:  10/12/2021          INTERPRETATION:  Noncontrast CT of the brain.    CLINICAL INDICATION:  crani for left frontal parafalcine probable meningioma    TECHNIQUE : Axial CT scanning of the brain was obtained from the skull base to the vertex without the administration of intravenous contrast. Sagittal and coronal reformats were provided.    COMPARISON: MRI brain 8/7/2020    FINDINGS:    Interval left frontal craniotomy. Subjacent extra-axial air and tiny extra-axial fluid.    Tiny foci of hemorrhage along the peripheral left mesial frontal surgical bed.    No hydrocephalus or midline shift. No effacement of basal cisterns.    The visualized paranasal sinuses and mastoid air cells are clear.    IMPRESSION:    Interval left frontal craniotomy with subjacent extra-axial air and tiny extra-axial fluid.  Tiny foci of hemorrhage along the peripheral left mesial frontal surgical bed.  No midline shift or effacement of basal cisterns.      --- End of Report ---            SAV PETERSON MD; Attending Radiologist  This document has been electronically signed. Oct 12 2021  5:27PM    < end of copied text >        Assessment: 51F s/p embolization and POD 1 from a left craniotomy for resection of meningioma with stable anticipated clinical course. Patient has been neurointact and is stable for donwgrade to NSGY service to stepdown.      Neuro:  	Q2 hour Neuro checks, Q2 hour Vitals  	HOB 30 degrees, Neck midline position  	Maintain normothermia, PO acetaminophen for temp>38 C or pain                Subgaleal LEOLA drain to full suction - plan to remove drain on 10/14 pending confirmation during morning rounds   	Post operative CTH on 10/12 was reviewed with NSGY and neurocritical care staff and par for the course showing anticipated post operative changes   	STAT CTH if neurological decline, otherwise stability scan as per NSGY attendings   	Continue Keppra 500 BID X 7 day total course for seizure prophylaxis   	Monitor surgical site for signs of infection, bleeding etc   	Tylenol / percocet for pain control PRN   	PT/OT, mobilize                 Pending MRI w/wo   	  CV:  	SBP Goal   	Hydralazine PRNs ordered if necessary     Pulm:  	  	Saturating well on RA     GI:  	Tolerating regular diet                Zofran PRN for periodic nausea   	  Gu:  	Voiding without garay catheter   	Monitor Electrolytes & Renal Function    Heme:  	Monitor H&H daily   	Hold AC/AP at this time   	Chemical DVT prophylaxis: Follow up with NSGY attending regarding start of DVT ppx   	Mechanical DVT Prophylaxis: Maintain B/L LE sequential compression devices  	  ID:  	Monitor WBC and Temperature  	Antibiotic Regimen: Ancef X 3 doses were completed post operatively   		    Endo  	Monitor BGL, maintain <180; A1C 5.9 and TSH 4.53 noting a stable endocrine profile

## 2021-10-13 NOTE — PHARMACOTHERAPY INTERVENTION NOTE - COMMENTS
Spoke with patient at bedside regarding home medications. Patient reports she does not take any medications at home

## 2021-10-13 NOTE — PHYSICAL THERAPY INITIAL EVALUATION ADULT - DISCHARGE DISPOSITION, PT EVAL
and assistance as needed, rehab disposition recommendation may be change base on patient  functional progress/home w/ outpatient services

## 2021-10-13 NOTE — DIETITIAN INITIAL EVALUATION ADULT. - PERTINENT MEDS FT
MEDICATIONS  (STANDING):  ceFAZolin   IVPB 1000 milliGRAM(s) IV Intermittent every 8 hours  ceFAZolin   IVPB      levETIRAcetam 500 milliGRAM(s) Oral every 12 hours  sodium chloride 0.9%. 1000 milliLiter(s) (75 mL/Hr) IV Continuous <Continuous>    MEDICATIONS  (PRN):  acetaminophen   Tablet .. 650 milliGRAM(s) Oral every 6 hours PRN Temp greater or equal to 38C (100.4F), Mild Pain (1 - 3)  hydrALAZINE 10 milliGRAM(s) Oral every 4 hours PRN SBP >140  ondansetron Injectable 4 milliGRAM(s) IV Push every 6 hours PRN Nausea and/or Vomiting  oxycodone    5 mG/acetaminophen 325 mG 1 Tablet(s) Oral every 4 hours PRN Moderate Pain (4 - 6)

## 2021-10-13 NOTE — PROGRESS NOTE ADULT - SUBJECTIVE AND OBJECTIVE BOX
Chief complaint:   Patient is a 51y old  Female who presents with a chief complaint of   HPI:        24hr EVENTS:      ROS: [ ]  Unable to assess due to mental status   All other systems negative    -----------------------------------------------------------------------------------------------------------------------------------------------------------------------------------  ICU Vital Signs Last 24 Hrs  T(C): 37 (13 Oct 2021 07:45), Max: 37 (13 Oct 2021 07:45)  T(F): 98.6 (13 Oct 2021 07:45), Max: 98.6 (13 Oct 2021 07:45)  HR: 81 (13 Oct 2021 06:00) (53 - 108)  BP: 113/71 (13 Oct 2021 06:00) (91/58 - 123/80)  BP(mean): 85 (13 Oct 2021 06:00) (69 - 94)  ABP: 98/45 (13 Oct 2021 03:00) (94/42 - 161/75)  ABP(mean): 65 (13 Oct 2021 03:00) (61 - 105)  RR: 13 (13 Oct 2021 06:00) (11 - 21)  SpO2: 100% (13 Oct 2021 06:00) (100% - 100%)      I&O's Summary    12 Oct 2021 07:01  -  13 Oct 2021 07:00  --------------------------------------------------------  IN: 2010 mL / OUT: 1850 mL / NET: 160 mL    13 Oct 2021 07:01  -  13 Oct 2021 08:09  --------------------------------------------------------  IN: 75 mL / OUT: 300 mL / NET: -225 mL        MEDICATIONS  (STANDING):  ceFAZolin   IVPB 1000 milliGRAM(s) IV Intermittent every 8 hours  ceFAZolin   IVPB      levETIRAcetam 500 milliGRAM(s) Oral every 12 hours  sodium chloride 0.9%. 1000 milliLiter(s) (75 mL/Hr) IV Continuous <Continuous>      RESPIRATORY:        IMAGING:   Recent imaging studies were reviewed.    LAB RESULTS:                          9.7    10.07 )-----------( 212      ( 13 Oct 2021 04:08 )             28.7       PT/INR - ( 12 Oct 2021 04:28 )   PT: 12.1 sec;   INR: 1.05 ratio         PTT - ( 12 Oct 2021 04:28 )  PTT:29.7 sec    10-13    138  |  103  |  8.3  ----------------------------<  143<H>  3.8   |  22.0  |  0.53    Ca    8.8      13 Oct 2021 04:08  Phos  3.5     10-13  Mg     1.9     10-13        -----------------------------------------------------------------------------------------------------------------------------------------------------------------------------------    PHYSICAL EXAM:  General: Calm, laying in bed  HEENT: MMM  Neuro:  -Mental status- No acute distress, AOx3, conversational, following commands  -CN- PERRL 3mm, EOMI, tongue midline, face symmetric  -Motor- full strength in all ext  -Sensation- intact to LT   -Coordination- no dysmetria noted    CV: RRR  Pulm: Clear to auscultation  Abd: Soft, nontender, nondistended  Ext: No edema  Skin: warm, dry   Chief complaint:   Patient is a 51y old  Female who presents with a chief complaint of meningioma    24hr EVENTS:  POD 1    ROS:no complaints   All other systems negative    -----------------------------------------------------------------------------------------------------------------------------------------------------------------------------------  ICU Vital Signs Last 24 Hrs  T(C): 37 (13 Oct 2021 07:45), Max: 37 (13 Oct 2021 07:45)  T(F): 98.6 (13 Oct 2021 07:45), Max: 98.6 (13 Oct 2021 07:45)  HR: 81 (13 Oct 2021 06:00) (53 - 108)  BP: 113/71 (13 Oct 2021 06:00) (91/58 - 123/80)  BP(mean): 85 (13 Oct 2021 06:00) (69 - 94)  ABP: 98/45 (13 Oct 2021 03:00) (94/42 - 161/75)  ABP(mean): 65 (13 Oct 2021 03:00) (61 - 105)  RR: 13 (13 Oct 2021 06:00) (11 - 21)  SpO2: 100% (13 Oct 2021 06:00) (100% - 100%)      I&O's Summary    12 Oct 2021 07:01  -  13 Oct 2021 07:00  --------------------------------------------------------  IN: 2010 mL / OUT: 1850 mL / NET: 160 mL    13 Oct 2021 07:01  -  13 Oct 2021 08:09  --------------------------------------------------------  IN: 75 mL / OUT: 300 mL / NET: -225 mL        MEDICATIONS  (STANDING):  ceFAZolin   IVPB 1000 milliGRAM(s) IV Intermittent every 8 hours  ceFAZolin   IVPB      levETIRAcetam 500 milliGRAM(s) Oral every 12 hours  sodium chloride 0.9%. 1000 milliLiter(s) (75 mL/Hr) IV Continuous <Continuous>      IMAGING:   Recent imaging studies were reviewed.    LAB RESULTS:                          9.7    10.07 )-----------( 212      ( 13 Oct 2021 04:08 )             28.7       PT/INR - ( 12 Oct 2021 04:28 )   PT: 12.1 sec;   INR: 1.05 ratio         PTT - ( 12 Oct 2021 04:28 )  PTT:29.7 sec    10-13    138  |  103  |  8.3  ----------------------------<  143<H>  3.8   |  22.0  |  0.53    Ca    8.8      13 Oct 2021 04:08  Phos  3.5     10-13  Mg     1.9     10-13        -----------------------------------------------------------------------------------------------------------------------------------------------------------------------------------    PHYSICAL EXAM:  General: Calm, laying in bed  HEENT: MMM  Neuro:  -Mental status- No acute distress, AOx3, conversational, following commands  -CN- PERRL 3mm, EOMI, tongue midline, face symmetric  -Motor- full strength in all ext  -Sensation- intact to LT   -Coordination- no dysmetria noted    CV: RRR  Pulm: Clear to auscultation  Abd: Soft, nontender, nondistended  Ext: No edema  Skin: warm, dry

## 2021-10-13 NOTE — PROGRESS NOTE ADULT - ASSESSMENT
Assessment:   52 yo F with PMH headaches, found to have L frontal meningioma, scheduled for resection with Dr. Ayala on 10/12. Patient underwent tumor embolization today with Dr. Owens, tolerated procedure well and currently has no deficits.       Plan:   Neuro:  - q2hr neurochecks  - HOB 30 degrees, Neck midline position  - Maintain normothermia, PO acetaminophen for temp>38 C or pain  - Neurosurgical Imaging Reviewed  - Pain management & Sedation: tylenol PRN  - Turn and Position Q2  /  Activity ad mary, with assistance  	  CV:  - SBP Goal:   - BP regimen: hydralazine PRN    Pulm:  - Supplemental O2 PRN to maintain Spo2>92%  - Chest PT, OOB, Pulmonary Toilet    GI:  - Nutrition: regular diet  - Zofran PRN for nausea   	  Gu:  - Voiding  - IVF while NPO  - Monitor Electrolytes & Renal Function    Heme:  - Monitor H&H  - Chemical DVT prophylaxis: Chemical DVT prophylaxis held due to OR   - Mechanical DVT Prophylaxis: Maintain B/L LE sequential compression devices  	  ID:  - Monitor WBC and Temperature    Endo  - Monitor BGL, maintain <180  - HbA1C 5.8%      Assessment:   50 yo F with PMH headaches, found to have L frontal meningioma, scheduled for resection with Dr. Ayala on 10/12. Patient underwent tumor embolization today with Dr. Owens, tolerated procedure well and currently has no deficits.       Plan:   Neuro:  - q2hr neurochecks  - HOB 30 degrees, Neck midline position  - LEOLA drain per neurosurgery   - Maintain normothermia, PO acetaminophen for temp>38 C or pain  - Neurosurgical Imaging Reviewed  - keppra 500mg bid  - Pain management & Sedation: tylenol PRN  - Turn and Position Q2  /  Activity ad mary, with assistance  	  CV:  - SBP Goal: 100-140  - BP regimen: hydralazine PRN    Pulm:  - Supplemental O2 PRN to maintain Spo2>92%  - Chest PT, OOB, Pulmonary Toilet    GI:  - Nutrition: regular diet  - Zofran PRN for nausea   	  Gu:  - Voiding  - IVF while NPO  - Monitor Electrolytes & Renal Function    Heme:  - Monitor H&H  - Chemical DVT prophylaxis: Chemical DVT prophylaxis held due to OR   - Mechanical DVT Prophylaxis: Maintain B/L LE sequential compression devices  	  ID:  - Monitor WBC and Temperature  - navid op abx    Endo  - Monitor BGL, maintain <180  - HbA1C 5.8%

## 2021-10-13 NOTE — OCCUPATIONAL THERAPY INITIAL EVALUATION ADULT - LIVES WITH, PROFILE
, and 4 adult children (28,27,24,20,16), in a private house with 2 steps to enter no railing and 12 steps inside no railing; pt has assistance as needed upon discharge/children/spouse

## 2021-10-14 LAB
ANION GAP SERPL CALC-SCNC: 10 MMOL/L — SIGNIFICANT CHANGE UP (ref 5–17)
BUN SERPL-MCNC: 10.8 MG/DL — SIGNIFICANT CHANGE UP (ref 8–20)
CALCIUM SERPL-MCNC: 9.1 MG/DL — SIGNIFICANT CHANGE UP (ref 8.6–10.2)
CHLORIDE SERPL-SCNC: 101 MMOL/L — SIGNIFICANT CHANGE UP (ref 98–107)
CO2 SERPL-SCNC: 27 MMOL/L — SIGNIFICANT CHANGE UP (ref 22–29)
CREAT SERPL-MCNC: 0.57 MG/DL — SIGNIFICANT CHANGE UP (ref 0.5–1.3)
GLUCOSE BLDC GLUCOMTR-MCNC: 108 MG/DL — HIGH (ref 70–99)
GLUCOSE SERPL-MCNC: 101 MG/DL — HIGH (ref 70–99)
HCT VFR BLD CALC: 34.6 % — SIGNIFICANT CHANGE UP (ref 34.5–45)
HGB BLD-MCNC: 11.5 G/DL — SIGNIFICANT CHANGE UP (ref 11.5–15.5)
MAGNESIUM SERPL-MCNC: 2.1 MG/DL — SIGNIFICANT CHANGE UP (ref 1.6–2.6)
MCHC RBC-ENTMCNC: 27.9 PG — SIGNIFICANT CHANGE UP (ref 27–34)
MCHC RBC-ENTMCNC: 33.2 GM/DL — SIGNIFICANT CHANGE UP (ref 32–36)
MCV RBC AUTO: 84 FL — SIGNIFICANT CHANGE UP (ref 80–100)
PHOSPHATE SERPL-MCNC: 3.5 MG/DL — SIGNIFICANT CHANGE UP (ref 2.4–4.7)
PLATELET # BLD AUTO: 220 K/UL — SIGNIFICANT CHANGE UP (ref 150–400)
POTASSIUM SERPL-MCNC: 4.2 MMOL/L — SIGNIFICANT CHANGE UP (ref 3.5–5.3)
POTASSIUM SERPL-SCNC: 4.2 MMOL/L — SIGNIFICANT CHANGE UP (ref 3.5–5.3)
RBC # BLD: 4.12 M/UL — SIGNIFICANT CHANGE UP (ref 3.8–5.2)
RBC # FLD: 12.5 % — SIGNIFICANT CHANGE UP (ref 10.3–14.5)
SODIUM SERPL-SCNC: 138 MMOL/L — SIGNIFICANT CHANGE UP (ref 135–145)
WBC # BLD: 7.93 K/UL — SIGNIFICANT CHANGE UP (ref 3.8–10.5)
WBC # FLD AUTO: 7.93 K/UL — SIGNIFICANT CHANGE UP (ref 3.8–10.5)

## 2021-10-14 RX ORDER — ENOXAPARIN SODIUM 100 MG/ML
40 INJECTION SUBCUTANEOUS DAILY
Refills: 0 | Status: DISCONTINUED | OUTPATIENT
Start: 2021-10-14 | End: 2021-10-15

## 2021-10-14 RX ADMIN — LEVETIRACETAM 500 MILLIGRAM(S): 250 TABLET, FILM COATED ORAL at 05:17

## 2021-10-14 RX ADMIN — ENOXAPARIN SODIUM 40 MILLIGRAM(S): 100 INJECTION SUBCUTANEOUS at 17:03

## 2021-10-14 RX ADMIN — Medication 650 MILLIGRAM(S): at 05:19

## 2021-10-14 RX ADMIN — LEVETIRACETAM 500 MILLIGRAM(S): 250 TABLET, FILM COATED ORAL at 17:02

## 2021-10-14 RX ADMIN — Medication 650 MILLIGRAM(S): at 17:02

## 2021-10-14 NOTE — PROGRESS NOTE ADULT - SUBJECTIVE AND OBJECTIVE BOX
INTERVAL HPI/OVERNIGHT EVENTS:  50 y/o female w/ PMHx of worsening intermittent HAs, had MRI performed which read as showing 3.0 cm extra-axial left frontal parasagittal mass which likely representing a meningioma, admitted 10/11/21 for scheduled cerebral angiogram and tumor resection. Pt seen and evaluated this morning. Pt reports mild HA, denies N/V, dizziness.       Vital Signs Last 24 Hrs  T(C): 36.5 (14 Oct 2021 08:00), Max: 37.3 (13 Oct 2021 19:18)  T(F): 97.7 (14 Oct 2021 08:00), Max: 99.1 (13 Oct 2021 19:18)  HR: 70 (14 Oct 2021 09:30) (68 - 89)  BP: 103/66 (14 Oct 2021 08:00) (101/62 - 112/57)  BP(mean): 71 (14 Oct 2021 04:00) (68 - 77)  RR: 16 (14 Oct 2021 09:30) (14 - 21)  SpO2: 100% (14 Oct 2021 09:30) (99% - 100%)      PHYSICAL EXAM:  GENERAL: NAD, well-groomed, well-developed  HEAD:  s/p left craniotomy  DRAINS: LEOLA x 1 (removed today)  WOUND: Dressing clean dry intact  MENTAL STATUS: AAO x3; Awake; Opens eyes spontaneously; Appropriately conversant without aphasia; following simple commands  CRANIAL NERVES: KHLOE; EOMI; no facial asymmetry; facial sensation grossly intact to light touch b/l;  tongue midline; palate rises symmetrically  MOTOR: strength 5/5 B/L upper and lower extremities; sensation grossly intact all extremities  CHEST/LUNG: Clear to auscultation bilaterally; no rales, rhonchi, wheezing, appreciated  HEART: +S1/+S2; Regular rate and rhythm; no murmurs, rubs  ABDOMEN: Soft, nontender, nondistended; bowel sounds present   EXTREMITIES: no clubbing, cyanosis, or edema  SKIN: Warm, drys      LABS:                        11.5   7.93  )-----------( 220      ( 14 Oct 2021 06:43 )             34.6     10-14    138  |  101  |  10.8  ----------------------------<  101<H>  4.2   |  27.0  |  0.57    Ca    9.1      14 Oct 2021 06:43  Phos  3.5     10-14  Mg     2.1     10-14        10-13 @ 07:01  -  10-14 @ 07:00  --------------------------------------------------------  IN: 590 mL / OUT: 1525 mL / NET: -935 mL      RADIOLOGY & ADDITIONAL TESTS:    EXAM:  MR BRAIN WAW IC    PROCEDURE DATE:  08/07/2021  IMPRESSION:  3.0 cm extra-axial left frontal parasagittal mass which likely represents a meningioma. There is prominent mass effect on the adjacent medial left frontal lobe with associated underlying mild parenchymal vasogenic edema. No midline shift.      EXAM:  CT BRAIN                        PROCEDURE DATE:  10/12/2021    IMPRESSION:  Interval left frontal craniotomy with subjacent extra-axial air and tiny extra-axial fluid.  Tiny foci of hemorrhage along the peripheral left mesial frontal surgical bed.  No midline shift or effacement of basal cisterns.

## 2021-10-14 NOTE — PROGRESS NOTE ADULT - ASSESSMENT
50 y/o female w/ PMHx of worsening intermittent HAs, had MRI performed which read as showing 3.0 cm extra-axial left frontal parasagittal mass which likely representing a meningioma, admitted 10/11/21 for scheduled cerebral angiogram and tumor resection.   Pt s/p drain removal today. Pt tolerated procedure well. Two staples placed at drainage site. No acute complications. Dressing changed w/ clean dry dressing.     10/11/21 L frontal meningioma s/p middle meningeal artery embolization with 1.5 ml embospheres and 2 coils   10/12/21  s/p Left craniotomy for tumor resection  10/14/21 LEOLA drain removed     -Pt seen and case discussed w/ Dr. Ayala  -images removed  -pain control as needed, avoid over sedation  -SCDs and lovenox for DVT prophylaxis  -PT/ OOB to chair  -repeat CT brain prior to discharge   -maintain systolic  to 150  -continue to follow

## 2021-10-15 ENCOUNTER — TRANSCRIPTION ENCOUNTER (OUTPATIENT)
Age: 51
End: 2021-10-15

## 2021-10-15 VITALS
SYSTOLIC BLOOD PRESSURE: 101 MMHG | TEMPERATURE: 98 F | HEART RATE: 91 BPM | OXYGEN SATURATION: 95 % | DIASTOLIC BLOOD PRESSURE: 71 MMHG | RESPIRATION RATE: 18 BRPM

## 2021-10-15 LAB
ALBUMIN SERPL ELPH-MCNC: 3.9 G/DL — SIGNIFICANT CHANGE UP (ref 3.3–5.2)
ALBUMIN SERPL ELPH-MCNC: 4.5 G/DL
ALP BLD-CCNC: 70 U/L
ALP SERPL-CCNC: 69 U/L — SIGNIFICANT CHANGE UP (ref 40–120)
ALT FLD-CCNC: 15 U/L — SIGNIFICANT CHANGE UP
ALT SERPL-CCNC: 14 U/L
ANION GAP SERPL CALC-SCNC: 14 MMOL/L — SIGNIFICANT CHANGE UP (ref 5–17)
ANION GAP SERPL CALC-SCNC: 16 MMOL/L
APPEARANCE: CLEAR
AST SERPL-CCNC: 20 U/L — SIGNIFICANT CHANGE UP
AST SERPL-CCNC: 21 U/L
BACTERIA: NEGATIVE
BASOPHILS # BLD AUTO: 0.03 K/UL — SIGNIFICANT CHANGE UP (ref 0–0.2)
BASOPHILS # BLD AUTO: 0.04 K/UL
BASOPHILS NFR BLD AUTO: 0.3 % — SIGNIFICANT CHANGE UP (ref 0–2)
BASOPHILS NFR BLD AUTO: 0.5 %
BILIRUB SERPL-MCNC: 0.6 MG/DL
BILIRUB SERPL-MCNC: 0.8 MG/DL — SIGNIFICANT CHANGE UP (ref 0.4–2)
BILIRUBIN URINE: NEGATIVE
BLOOD URINE: NEGATIVE
BUN SERPL-MCNC: 11 MG/DL
BUN SERPL-MCNC: 12.3 MG/DL — SIGNIFICANT CHANGE UP (ref 8–20)
CALCIUM SERPL-MCNC: 10.2 MG/DL
CALCIUM SERPL-MCNC: 9.2 MG/DL — SIGNIFICANT CHANGE UP (ref 8.6–10.2)
CHLORIDE SERPL-SCNC: 100 MMOL/L
CHLORIDE SERPL-SCNC: 99 MMOL/L — SIGNIFICANT CHANGE UP (ref 98–107)
CO2 SERPL-SCNC: 24 MMOL/L
CO2 SERPL-SCNC: 24 MMOL/L — SIGNIFICANT CHANGE UP (ref 22–29)
COLOR: NORMAL
CREAT SERPL-MCNC: 0.48 MG/DL — LOW (ref 0.5–1.3)
CREAT SERPL-MCNC: 0.64 MG/DL
EOSINOPHIL # BLD AUTO: 0.1 K/UL — SIGNIFICANT CHANGE UP (ref 0–0.5)
EOSINOPHIL # BLD AUTO: 0.13 K/UL
EOSINOPHIL NFR BLD AUTO: 1.1 % — SIGNIFICANT CHANGE UP (ref 0–6)
EOSINOPHIL NFR BLD AUTO: 1.7 %
GLUCOSE QUALITATIVE U: NEGATIVE
GLUCOSE SERPL-MCNC: 110 MG/DL — HIGH (ref 70–99)
GLUCOSE SERPL-MCNC: 88 MG/DL
HCG SERPL-MCNC: <1 MIU/ML
HCT VFR BLD CALC: 36.7 % — SIGNIFICANT CHANGE UP (ref 34.5–45)
HCT VFR BLD CALC: 41.8 %
HGB BLD-MCNC: 12.5 G/DL — SIGNIFICANT CHANGE UP (ref 11.5–15.5)
HGB BLD-MCNC: 13.2 G/DL
HYALINE CASTS: 4 /LPF
IMM GRANULOCYTES NFR BLD AUTO: 0 %
IMM GRANULOCYTES NFR BLD AUTO: 0.3 % — SIGNIFICANT CHANGE UP (ref 0–1.5)
INR PPP: 0.98 RATIO
KETONES URINE: NEGATIVE
LEUKOCYTE ESTERASE URINE: NEGATIVE
LYMPHOCYTES # BLD AUTO: 1.83 K/UL — SIGNIFICANT CHANGE UP (ref 1–3.3)
LYMPHOCYTES # BLD AUTO: 2.68 K/UL
LYMPHOCYTES # BLD AUTO: 20.4 % — SIGNIFICANT CHANGE UP (ref 13–44)
LYMPHOCYTES NFR BLD AUTO: 35.9 %
MAGNESIUM SERPL-MCNC: 2.1 MG/DL — SIGNIFICANT CHANGE UP (ref 1.6–2.6)
MAN DIFF?: NORMAL
MCHC RBC-ENTMCNC: 27.9 PG
MCHC RBC-ENTMCNC: 28 PG — SIGNIFICANT CHANGE UP (ref 27–34)
MCHC RBC-ENTMCNC: 31.6 GM/DL
MCHC RBC-ENTMCNC: 34.1 GM/DL — SIGNIFICANT CHANGE UP (ref 32–36)
MCV RBC AUTO: 82.3 FL — SIGNIFICANT CHANGE UP (ref 80–100)
MCV RBC AUTO: 88.4 FL
MICROSCOPIC-UA: NORMAL
MONOCYTES # BLD AUTO: 0.6 K/UL
MONOCYTES # BLD AUTO: 0.75 K/UL — SIGNIFICANT CHANGE UP (ref 0–0.9)
MONOCYTES NFR BLD AUTO: 8 %
MONOCYTES NFR BLD AUTO: 8.4 % — SIGNIFICANT CHANGE UP (ref 2–14)
NEUTROPHILS # BLD AUTO: 4.01 K/UL
NEUTROPHILS # BLD AUTO: 6.24 K/UL — SIGNIFICANT CHANGE UP (ref 1.8–7.4)
NEUTROPHILS NFR BLD AUTO: 53.9 %
NEUTROPHILS NFR BLD AUTO: 69.5 % — SIGNIFICANT CHANGE UP (ref 43–77)
NITRITE URINE: NEGATIVE
PH URINE: 6
PHOSPHATE SERPL-MCNC: 4 MG/DL — SIGNIFICANT CHANGE UP (ref 2.4–4.7)
PLATELET # BLD AUTO: 244 K/UL — SIGNIFICANT CHANGE UP (ref 150–400)
PLATELET # BLD AUTO: 282 K/UL
POTASSIUM SERPL-MCNC: 4.3 MMOL/L — SIGNIFICANT CHANGE UP (ref 3.5–5.3)
POTASSIUM SERPL-SCNC: 4.3 MMOL/L — SIGNIFICANT CHANGE UP (ref 3.5–5.3)
POTASSIUM SERPL-SCNC: 4.7 MMOL/L
PROT SERPL-MCNC: 7.4 G/DL — SIGNIFICANT CHANGE UP (ref 6.6–8.7)
PROT SERPL-MCNC: 8.6 G/DL
PROTEIN URINE: NEGATIVE
PT BLD: 11.6 SEC
RBC # BLD: 4.46 M/UL — SIGNIFICANT CHANGE UP (ref 3.8–5.2)
RBC # BLD: 4.73 M/UL
RBC # FLD: 12.2 % — SIGNIFICANT CHANGE UP (ref 10.3–14.5)
RBC # FLD: 13.1 %
RED BLOOD CELLS URINE: 1 /HPF
SODIUM SERPL-SCNC: 136 MMOL/L — SIGNIFICANT CHANGE UP (ref 135–145)
SODIUM SERPL-SCNC: 140 MMOL/L
SPECIFIC GRAVITY URINE: 1.02
SQUAMOUS EPITHELIAL CELLS: 4 /HPF
TSH SERPL-ACNC: 1.12 UIU/ML
UROBILINOGEN URINE: NORMAL
WBC # BLD: 8.98 K/UL — SIGNIFICANT CHANGE UP (ref 3.8–10.5)
WBC # FLD AUTO: 7.46 K/UL
WBC # FLD AUTO: 8.98 K/UL — SIGNIFICANT CHANGE UP (ref 3.8–10.5)
WHITE BLOOD CELLS URINE: 4 /HPF

## 2021-10-15 PROCEDURE — 75894 X-RAYS TRANSCATH THERAPY: CPT

## 2021-10-15 PROCEDURE — 75898 FOLLOW-UP ANGIOGRAPHY: CPT

## 2021-10-15 PROCEDURE — 36415 COLL VENOUS BLD VENIPUNCTURE: CPT

## 2021-10-15 PROCEDURE — 97163 PT EVAL HIGH COMPLEX 45 MIN: CPT

## 2021-10-15 PROCEDURE — 97110 THERAPEUTIC EXERCISES: CPT

## 2021-10-15 PROCEDURE — 83735 ASSAY OF MAGNESIUM: CPT

## 2021-10-15 PROCEDURE — 80061 LIPID PANEL: CPT

## 2021-10-15 PROCEDURE — 84443 ASSAY THYROID STIM HORMONE: CPT

## 2021-10-15 PROCEDURE — 85027 COMPLETE CBC AUTOMATED: CPT

## 2021-10-15 PROCEDURE — 80053 COMPREHEN METABOLIC PANEL: CPT

## 2021-10-15 PROCEDURE — 84703 CHORIONIC GONADOTROPIN ASSAY: CPT

## 2021-10-15 PROCEDURE — C1887: CPT

## 2021-10-15 PROCEDURE — 88307 TISSUE EXAM BY PATHOLOGIST: CPT

## 2021-10-15 PROCEDURE — 85610 PROTHROMBIN TIME: CPT

## 2021-10-15 PROCEDURE — U0005: CPT

## 2021-10-15 PROCEDURE — 85730 THROMBOPLASTIN TIME PARTIAL: CPT

## 2021-10-15 PROCEDURE — 83036 HEMOGLOBIN GLYCOSYLATED A1C: CPT

## 2021-10-15 PROCEDURE — 99239 HOSP IP/OBS DSCHRG MGMT >30: CPT

## 2021-10-15 PROCEDURE — C1713: CPT

## 2021-10-15 PROCEDURE — 85025 COMPLETE CBC W/AUTO DIFF WBC: CPT

## 2021-10-15 PROCEDURE — 70450 CT HEAD/BRAIN W/O DYE: CPT

## 2021-10-15 PROCEDURE — 36224 PLACE CATH CAROTD ART: CPT

## 2021-10-15 PROCEDURE — 84436 ASSAY OF TOTAL THYROXINE: CPT

## 2021-10-15 PROCEDURE — 70450 CT HEAD/BRAIN W/O DYE: CPT | Mod: 26

## 2021-10-15 PROCEDURE — C1894: CPT

## 2021-10-15 PROCEDURE — U0003: CPT

## 2021-10-15 PROCEDURE — 36227 PLACE CATH XTRNL CAROTID: CPT

## 2021-10-15 PROCEDURE — 80048 BASIC METABOLIC PNL TOTAL CA: CPT

## 2021-10-15 PROCEDURE — 84100 ASSAY OF PHOSPHORUS: CPT

## 2021-10-15 PROCEDURE — C1763: CPT

## 2021-10-15 PROCEDURE — 82962 GLUCOSE BLOOD TEST: CPT

## 2021-10-15 PROCEDURE — C1889: CPT

## 2021-10-15 PROCEDURE — 86769 SARS-COV-2 COVID-19 ANTIBODY: CPT

## 2021-10-15 PROCEDURE — 97116 GAIT TRAINING THERAPY: CPT

## 2021-10-15 PROCEDURE — 88342 IMHCHEM/IMCYTCHM 1ST ANTB: CPT

## 2021-10-15 PROCEDURE — 88360 TUMOR IMMUNOHISTOCHEM/MANUAL: CPT

## 2021-10-15 PROCEDURE — C1769: CPT

## 2021-10-15 PROCEDURE — 97167 OT EVAL HIGH COMPLEX 60 MIN: CPT

## 2021-10-15 PROCEDURE — 61626 TCAT PERM OCCLS/EMBOL NONCNS: CPT

## 2021-10-15 RX ORDER — LEVETIRACETAM 250 MG/1
1 TABLET, FILM COATED ORAL
Qty: 0 | Refills: 0 | DISCHARGE
Start: 2021-10-15

## 2021-10-15 RX ORDER — LEVETIRACETAM 250 MG/1
1 TABLET, FILM COATED ORAL
Qty: 20 | Refills: 0
Start: 2021-10-15 | End: 2021-10-24

## 2021-10-15 RX ADMIN — ENOXAPARIN SODIUM 40 MILLIGRAM(S): 100 INJECTION SUBCUTANEOUS at 11:52

## 2021-10-15 RX ADMIN — ONDANSETRON 4 MILLIGRAM(S): 8 TABLET, FILM COATED ORAL at 15:57

## 2021-10-15 RX ADMIN — LEVETIRACETAM 500 MILLIGRAM(S): 250 TABLET, FILM COATED ORAL at 05:50

## 2021-10-15 RX ADMIN — Medication 650 MILLIGRAM(S): at 05:51

## 2021-10-15 NOTE — DISCHARGE NOTE NURSING/CASE MANAGEMENT/SOCIAL WORK - NSDPDISTO_GEN_ALL_CORE
72 YO M with a history of ACC/AHA Stage D NICM s/p OHT 2/2018 with coronary fistula with prior AMR, CKD III (baseline Cr 1.4), HCV s/p treatment, and recently diagnosed autoimmune hemolytic anemia who is admitted with symptomatic anemia with Hgb of 6.6. s/p EGD with esophagitis and gastritis. Developed Klebsiella oxytoca bacteremia requiring transfer to CTU. Clinically improving, transferred to [facility], finished 10-day of ceftriaxone, however, developed severe C.diff colitis on Vancomycin 500mg q6h PO and IV Flagyl. He had RHC on 8/25 and found to have high filling pressure so transferred to CTU again.    #C.diff colitis with NORMAN- C.diff PCR detected (8/23). Repeat CT on 8/30 without evidence of toxic megacolon.   - clinically  has hit a plateau and without significant improvement but also without deterioration  tolerating minimal amounts of oral food  continue po vanco but dose to 125mg QID  through 9/24 followed by Vanco 125mg tid for one week followed by Vanco 125mg bid x 1 week through 10/1  then Vancomycin 125mg QD for a few weeks  continue Marinol to improve appetite  Decision made not to pursue Bezlotoxumab based upon limited data and possible cardiac side effects       #OI prophylaxis-  -Was previously receiving high dose steroids  -CMV viral load(8/17, 8/26): neg,   -Valcyte and Bactrim d/c'ed on 8/17 due to leukopenia  -Galactomann<0.5, Fungitell<31  -Steroids being tapered currently 10mg/day prednisone    # CMV viral load  9/17= 256 copies/ml  repeat CMV viral laod sent 9/28 and pending result  possibly just a blip reflective of his immunosuppressed state/steroids  hold off on treatment decision until recent results return but if results are not back at time of discharge will discharge home on oral Valganciclovir    Current plan to discharge home possibly on 10/2    Gary Lujan MD  [phone]  After 5pm/weekends [phone]     Home

## 2021-10-15 NOTE — DISCHARGE NOTE PROVIDER - NSDCFUADDINST_GEN_ALL_CORE_FT
Keep incision clean and dry. May wash hair tomorrow 10/16 with mild baby shampoo.  Do not scrub surgical site.  Pat dry.   Then was every 3 to 4 days as needed. Do not appply anything on staples.   Make  appt to see Dr Ayala  7-10 day after the date of your surg.

## 2021-10-15 NOTE — DISCHARGE NOTE PROVIDER - CARE PROVIDER_API CALL
Patrick Ayala)  Neurosurgery  [address] [place] [address]  Phone: [phone]  Fax: [phone]  Follow Up Time: 2 weeks

## 2021-10-15 NOTE — DISCHARGE NOTE NURSING/CASE MANAGEMENT/SOCIAL WORK - PATIENT PORTAL LINK FT
You can access the FollowMyHealth Patient Portal offered by [facility] by registering at the following website: [email or web address]. By joining [employer]’s FollowMyHealth portal, you will also be able to view your health information using other applications (apps) compatible with our system.

## 2021-10-15 NOTE — HISTORY OF PRESENT ILLNESS
[No Pertinent Cardiac History] : no history of aortic stenosis, atrial fibrillation, coronary artery disease, recent myocardial infarction, or implantable device/pacemaker [No Pertinent Pulmonary History] : no history of asthma, COPD, sleep apnea, or smoking [No Adverse Anesthesia Reaction] : no adverse anesthesia reaction in self or family member [(Patient denies any chest pain, claudication, dyspnea on exertion, orthopnea, palpitations or syncope)] : Patient denies any chest pain, claudication, dyspnea on exertion, orthopnea, palpitations or syncope [Chronic Anticoagulation] : no chronic anticoagulation [Chronic Kidney Disease] : no chronic kidney disease [Diabetes] : no diabetes [FreeTextEntry1] : Cerebral Angiogram [FreeTextEntry2] : 10/11/2021 [FreeTextEntry3] : Dr. Elfego Owens [FreeTextEntry4] : Ms. SHAWNA HIGGINS is a 51 year old female presents for pre-operative clearance for a scheduled cerebral angiogram on 10/11/2021.\par She is doing well. \par Denies SOB, chest pain, abdominal pain, N/V/D, leg swelling, dizziness \par Offers no complaints\par \par

## 2021-10-15 NOTE — DISCHARGE NOTE PROVIDER - HOSPITAL COURSE
51 year old Right Handed female presents after MMA tumor embolization of L frontal meningioma. She reports history of headaches which are intermittent, but have become more frequent recently. She consulted with Dr. Guilherme Saravia who ordered brain MRI (8/7/2021) and showed a 3.0 cm extra axial left frontal mass representing meningioma with mass edema. An MRA on 8/31/2021 was done as well.  She had an MRI approximately 8 years ago which showed this lesion and as stated by the patient, it was the size of a "kidney bean". The headaches have become worsen as the mass grew. There is edema associated the the tumor and mass effect. Patient underwent MMA tumor embolization without difficulty. Patient is being admitted to the neuro ICU in preparation of tumor resection planned with Dr. Ayala tomorrow 10/12. Patient currently has no complaints, including headache, dizziness, weakness, numbness, tingling, N/V, CP, SOB. Pt underwent craniotomy om 10/12 for resection of left sided parasagital mass which has characteristics of a meningioma. Pt underwent the MMA embo the 10/11 to pre op for the surgical intervention. Post op pt has done well following the expected course.

## 2021-10-18 PROBLEM — Z86.69 PERSONAL HISTORY OF OTHER DISEASES OF THE NERVOUS SYSTEM AND SENSE ORGANS: Chronic | Status: ACTIVE | Noted: 2021-10-05

## 2021-10-18 LAB — SURGICAL PATHOLOGY STUDY: SIGNIFICANT CHANGE UP

## 2021-10-20 LAB
ESTIMATED AVERAGE GLUCOSE: 128 MG/DL
HBA1C MFR BLD HPLC: 6.1 %

## 2021-10-27 ENCOUNTER — APPOINTMENT (OUTPATIENT)
Dept: NEUROSURGERY | Facility: CLINIC | Age: 51
End: 2021-10-27
Payer: COMMERCIAL

## 2021-10-27 VITALS
OXYGEN SATURATION: 99 % | BODY MASS INDEX: 20.99 KG/M2 | HEART RATE: 77 BPM | HEIGHT: 58 IN | DIASTOLIC BLOOD PRESSURE: 73 MMHG | TEMPERATURE: 97.8 F | SYSTOLIC BLOOD PRESSURE: 130 MMHG | WEIGHT: 100 LBS

## 2021-10-27 PROCEDURE — 99024 POSTOP FOLLOW-UP VISIT: CPT

## 2021-11-03 ENCOUNTER — APPOINTMENT (OUTPATIENT)
Dept: CT IMAGING | Facility: IMAGING CENTER | Age: 51
End: 2021-11-03
Payer: COMMERCIAL

## 2021-11-03 ENCOUNTER — OUTPATIENT (OUTPATIENT)
Dept: OUTPATIENT SERVICES | Facility: HOSPITAL | Age: 51
LOS: 1 days | End: 2021-11-03
Payer: COMMERCIAL

## 2021-11-03 DIAGNOSIS — D32.0 BENIGN NEOPLASM OF CEREBRAL MENINGES: ICD-10-CM

## 2021-11-03 DIAGNOSIS — N83.20 UNSPECIFIED OVARIAN CYSTS: Chronic | ICD-10-CM

## 2021-11-03 PROCEDURE — 70450 CT HEAD/BRAIN W/O DYE: CPT | Mod: 26

## 2021-11-03 PROCEDURE — 70450 CT HEAD/BRAIN W/O DYE: CPT

## 2021-11-10 ENCOUNTER — LABORATORY RESULT (OUTPATIENT)
Age: 51
End: 2021-11-10

## 2021-11-10 ENCOUNTER — APPOINTMENT (OUTPATIENT)
Dept: DISASTER EMERGENCY | Facility: CLINIC | Age: 51
End: 2021-11-10

## 2021-11-15 ENCOUNTER — APPOINTMENT (OUTPATIENT)
Dept: MRI IMAGING | Facility: CLINIC | Age: 51
End: 2021-11-15
Payer: COMMERCIAL

## 2021-11-15 ENCOUNTER — OUTPATIENT (OUTPATIENT)
Dept: OUTPATIENT SERVICES | Facility: HOSPITAL | Age: 51
LOS: 1 days | End: 2021-11-15
Payer: COMMERCIAL

## 2021-11-15 DIAGNOSIS — D32.0 BENIGN NEOPLASM OF CEREBRAL MENINGES: ICD-10-CM

## 2021-11-15 DIAGNOSIS — N83.20 UNSPECIFIED OVARIAN CYSTS: Chronic | ICD-10-CM

## 2021-11-15 PROCEDURE — 70553 MRI BRAIN STEM W/O & W/DYE: CPT | Mod: 26

## 2021-11-15 PROCEDURE — A9585: CPT

## 2021-11-15 PROCEDURE — 70553 MRI BRAIN STEM W/O & W/DYE: CPT

## 2021-12-01 ENCOUNTER — APPOINTMENT (OUTPATIENT)
Dept: NEUROSURGERY | Facility: CLINIC | Age: 51
End: 2021-12-01
Payer: COMMERCIAL

## 2021-12-01 VITALS
TEMPERATURE: 98 F | HEIGHT: 58 IN | BODY MASS INDEX: 20.78 KG/M2 | HEART RATE: 73 BPM | WEIGHT: 99 LBS | DIASTOLIC BLOOD PRESSURE: 73 MMHG | OXYGEN SATURATION: 98 % | SYSTOLIC BLOOD PRESSURE: 120 MMHG

## 2021-12-01 PROCEDURE — 99024 POSTOP FOLLOW-UP VISIT: CPT

## 2021-12-02 NOTE — HISTORY OF PRESENT ILLNESS
[FreeTextEntry1] : Ms. Naya Schroeder presents to the office for a post operative visit s/p Left craniotomy for resection of parafalcine meningioma on 10/12/2021. Pathology Atypical WHO grade 2 meningioma, Ki-6 4-7%.\par TO REVIEW- 51 year old right handed female who initially presented with headaches and right eye pressure. She saw Dr. Guilherme Saravia and brain MRI demonstrated a left posterior frontal extra-axial mass. She had an MRI 8 years ago for same symptomatology, headaches. MRA was also performed and was unremarkable. She was found to have RIGHT papilledema on exam by Dr. Tao. \par It was felt due to young age and no comorbidities, papilledema on right and history of headaches, she was a candidate for resection. She underwent cerebral angiogram the day prior to surgery and surgical resection was successful. Post op ct scan showed small hemorrhage near craniotomy site. \par \par She denies headaches, dizziness, vision or speech disturbance or seizure activity. She states she feels well. Cranial incision healing nicely. She complains of tenderness and pain in the right groin region. There is a very tender abscess/boil lump noted below, distal, to puncture site where angiogram was performed. Puncture site is intact without erythema, drainage, ecchymosis. \par Ct stable. \par Plan: Dr. Reardon for consultation\par Dermatology- referral given for right groin boil\par fu in 6 months with MRI w wo [facility] location with SEDATION\par \par \par

## 2021-12-02 NOTE — PHYSICAL EXAM
[FreeTextEntry1] : Awake, alert, and oriented x 3. VSS. In no apparent distress.   Short and long term memory intact.  Speech is clear and appropriate.  Affect is normal.  Voice is strong.  Respirations easy and even.  Normal skin color and pigmentation.  The sclera and conjunctiva normal.  Ears, nose, and neck normal in appearance.  EOMI, no nystagmus, facial sensation intact symmetrically, face symmetrical, hearing intact bilaterally, tongue and palate midline, head turning and shoulder shrug symmetric and no tongue deviation with protrusion.  No pronator drift.   No past-pointing, no tremors noted, no dysdiadochokinesia, and finger to nose dysmetria was not present.  Romberg negative.  \par Right hand dominant.\par Incision healing well. \par Rises from a seated position in a comfortable fashion.\par \par Gait is well coordinated and stable without the use of an assistive device.   Able to perform tandem walk without loss of balance.   Motor strength in the upper extremities 5/5 in the biceps, triceps, and hand .  Motor strength in the lower extremities is 5/5 in the iliopsoas, quadriceps, and hamstrings.  \par \par

## 2021-12-02 NOTE — HISTORY OF PRESENT ILLNESS
[FreeTextEntry1] : Ms. Naya Schroeder presents to the office for a post operative visit s/p Left craniotomy for resection of parafalcine meningioma on 10/12/2021. Pathology Atypical WHO grade 2 meningioma, Ki-6 4-7%.\par \par TO REVIEW- 51 year old right handed female who initially presented with headaches and right eye pressure. She saw Dr. Guilherme Saravia and brain MRI demonstrated a left posterior frontal extra-axial mass. She had an MRI 8 years ago for same symptomatology, headaches. MRA was also performed and was unremarkable. She was found to have RIGHT papilledema on exam by Dr. Tao. \par It was felt due to young age and no comorbidities, papilledema on right and history of headaches, she was a candidate for resection. She underwent cerebral angiogram the day prior to surgery and surgical resection was successful. Post op ct scan showed small hemorrhage near craniotomy site. \par She denies headaches, dizziness, vision or speech disturbance or seizure activity. She states she feels well. Cranial staples removed without difficulty. \par \par Plan: CT non contrast now\par MRI w wo [facility] with anesthesia in [place] in a month with fu appt with Dr. Ayala\par Dr. Ayala will discuss the possibility of radiation consultation at next visit.

## 2021-12-27 ENCOUNTER — APPOINTMENT (OUTPATIENT)
Dept: RADIATION ONCOLOGY | Facility: CLINIC | Age: 51
End: 2021-12-27
Payer: COMMERCIAL

## 2021-12-27 DIAGNOSIS — D49.6 NEOPLASM OF UNSPECIFIED BEHAVIOR OF BRAIN: ICD-10-CM

## 2021-12-27 DIAGNOSIS — Z01.818 ENCOUNTER FOR OTHER PREPROCEDURAL EXAMINATION: ICD-10-CM

## 2021-12-27 PROCEDURE — 99205 OFFICE O/P NEW HI 60 MIN: CPT | Mod: 25,95

## 2021-12-28 PROBLEM — D49.6 BRAIN TUMOR: Status: RESOLVED | Noted: 2021-06-10 | Resolved: 2021-12-28

## 2021-12-28 PROBLEM — Z01.818 PREOP TESTING: Status: RESOLVED | Noted: 2021-10-07 | Resolved: 2021-12-28

## 2021-12-28 NOTE — HISTORY OF PRESENT ILLNESS
[Home] : at home, [unfilled] , at the time of the visit. [Other Location: e.g. Home (Enter Location, City,State)___] : at [unfilled] [FreeTextEntry1] : 51 year old female with right parafalcine atypical WHO Grade 2 meningioma Ki-6 4-7% s/p left craniotomy with resection of mass 10/12/21, now presenting for consideration of adjuvant radiation to the resection cavity. \par \par She initially presented with headaches and right eye pressure, for which she saw Dr. Guilherme Saravia, who ordered a brain MRI that revealed left posterior frontal extra-axial mass. On Dr. Tao' evaluation she was found to have right papilledema. Underwent cerebral angiogram on 10/12/21 and then proceeded with surgery, with successful resection, but with small hemorrhage near site of craniotomy.\par \par She has been healing well since completion of surgery. Today reports feeling well. Denies complaint apart from some numbness near site iof craniotomy.

## 2021-12-28 NOTE — LETTER CLOSING
[FreeTextEntry3] : Ananda Reardon MD\par Attending Physician\par Department of Radiation Medicine\par \par \par

## 2022-03-31 ENCOUNTER — APPOINTMENT (OUTPATIENT)
Dept: FAMILY MEDICINE | Facility: CLINIC | Age: 52
End: 2022-03-31
Payer: COMMERCIAL

## 2022-03-31 VITALS
HEIGHT: 58 IN | DIASTOLIC BLOOD PRESSURE: 82 MMHG | OXYGEN SATURATION: 99 % | TEMPERATURE: 97.6 F | HEART RATE: 68 BPM | SYSTOLIC BLOOD PRESSURE: 134 MMHG | WEIGHT: 109 LBS | BODY MASS INDEX: 22.88 KG/M2

## 2022-03-31 DIAGNOSIS — Z01.84 ENCOUNTER FOR ANTIBODY RESPONSE EXAMINATION: ICD-10-CM

## 2022-03-31 DIAGNOSIS — Z11.1 ENCOUNTER FOR SCREENING FOR RESPIRATORY TUBERCULOSIS: ICD-10-CM

## 2022-03-31 PROCEDURE — 99213 OFFICE O/P EST LOW 20 MIN: CPT

## 2022-03-31 NOTE — HISTORY OF PRESENT ILLNESS
[FreeTextEntry1] : work physical [de-identified] : 50 yo F with recent dx cerebral meningioma s/p resection presents for work physical. Pt is home health aide. The patient reports she does not have to do any heavy lifting. Needs updated MMR titers and TB screen. \par \par

## 2022-04-04 LAB
M TB IFN-G BLD-IMP: NEGATIVE
MEV IGG FLD QL IA: >300 AU/ML
MEV IGG+IGM SER-IMP: POSITIVE
QUANTIFERON TB PLUS MITOGEN MINUS NIL: 9.9 IU/ML
QUANTIFERON TB PLUS NIL: 0.1 IU/ML
QUANTIFERON TB PLUS TB1 MINUS NIL: 0.26 IU/ML
QUANTIFERON TB PLUS TB2 MINUS NIL: 0.15 IU/ML
RUBV IGG FLD-ACNC: 13.9 INDEX
RUBV IGG SER-IMP: POSITIVE

## 2022-04-05 ENCOUNTER — NON-APPOINTMENT (OUTPATIENT)
Age: 52
End: 2022-04-05

## 2022-05-10 NOTE — DIETITIAN INITIAL EVALUATION ADULT. - ORAL NUTRITION SUPPLEMENTS
Impression: Regular astigmatism, bilateral: H52.223. Plan: Prescription for glasses given today. Patient educated about adaptation to new glasses. Add Glucerna BID to optimize po intake and provide an additional 220 kcal, 10g protein per serving.

## 2022-06-22 ENCOUNTER — APPOINTMENT (OUTPATIENT)
Dept: NEUROSURGERY | Facility: CLINIC | Age: 52
End: 2022-06-22
Payer: COMMERCIAL

## 2022-06-22 VITALS
DIASTOLIC BLOOD PRESSURE: 75 MMHG | BODY MASS INDEX: 23.09 KG/M2 | HEART RATE: 83 BPM | WEIGHT: 110 LBS | OXYGEN SATURATION: 98 % | HEIGHT: 58 IN | TEMPERATURE: 98 F | SYSTOLIC BLOOD PRESSURE: 113 MMHG

## 2022-06-22 PROCEDURE — 99215 OFFICE O/P EST HI 40 MIN: CPT

## 2022-06-29 ENCOUNTER — APPOINTMENT (OUTPATIENT)
Dept: RADIATION ONCOLOGY | Facility: CLINIC | Age: 52
End: 2022-06-29

## 2022-06-29 NOTE — ASSESSMENT
[FreeTextEntry1] : 2022\par \par \par \par Re:	Naya Schroeder \par :	1970\par \par The patient was operated on in October for a left frontal parafalcine parasagittal meningioma.  She had done well in the postoperative period.  Her most recent imaging shows decreased postoperative enhancement and no evidence of residual or recurrent tumor.  The MRI looks excellent.  Her pathology was WHO grade 2, so she is seeing Dr. Reardon.  \par \par I read my operative note, and there was tumor involving the lateral wall of the superior sagittal sinus, so she may or may not be a candidate for radiation.  I will speak to \par Dr. Reardon.  Whether she receives the radiation or not, I will see her in 6 months’ time, which will be 2022, which will be a 6-month interval from the last MRI.  That MRI will be with and without gadolinium.\par \par She represents an excellent result after left frontal craniotomy.  She is completely neurologically intact with a flat, dry, and well-healed wound.\par \par \par \par Patrick Ayala M.D., F.A.C.S.\par [facility]\par

## 2022-06-29 NOTE — PHYSICAL EXAM
[General Appearance - Alert] : alert [General Appearance - In No Acute Distress] : in no acute distress [Well-Healed] : well-healed [Oriented To Time, Place, And Person] : oriented to person, place, and time [Impaired Insight] : insight and judgment were intact [Person] : oriented to person [Place] : oriented to place [Time] : oriented to time [Short Term Intact] : short term memory intact [Remote Intact] : remote memory intact [Span Intact] : the attention span was normal [Concentration Intact] : normal concentrating ability [Fluency] : fluency intact [Comprehension] : comprehension intact [Current Events] : adequate knowledge of current events [Past History] : adequate knowledge of personal past history [Vocabulary] : adequate range of vocabulary [Cranial Nerves Oculomotor (III)] : extraocular motion intact [Cranial Nerves Trigeminal (V)] : facial sensation intact symmetrically [Cranial Nerves Facial (VII)] : face symmetrical [Cranial Nerves Vestibulocochlear (VIII)] : hearing was intact bilaterally [Cranial Nerves Glossopharyngeal (IX)] : tongue and palate midline [Cranial Nerves Accessory (XI - Cranial And Spinal)] : head turning and shoulder shrug symmetric [Cranial Nerves Hypoglossal (XII)] : there was no tongue deviation with protrusion [Motor Tone] : muscle tone was normal in all four extremities [Motor Strength] : muscle strength was normal in all four extremities [No Muscle Atrophy] : normal bulk in all four extremities [Sensation Tactile Decrease] : light touch was intact [Balance] : balance was intact [Extraocular Movements] : extraocular movements were intact [Outer Ear] : the ears and nose were normal in appearance [Neck Appearance] : the appearance of the neck was normal [] : no respiratory distress [Respiration, Rhythm And Depth] : normal respiratory rhythm and effort [Exaggerated Use Of Accessory Muscles For Inspiration] : no accessory muscle use [Heart Rate And Rhythm] : heart rate was normal and rhythm regular [Abnormal Walk] : normal gait [Involuntary Movements] : no involuntary movements were seen [Skin Color & Pigmentation] : normal skin color and pigmentation [Skin Turgor] : normal skin turgor [Past-pointing] : there was no past-pointing [Tremor] : no tremor present [Coordination - Dysmetria Impaired Finger-to-Nose Bilateral] : not present

## 2022-06-29 NOTE — HISTORY OF PRESENT ILLNESS
[FreeTextEntry1] : Ms. Naya Schroeder presents to the office for a follow u p visit. She is s/p Left craniotomy for resection of parafalcine meningioma on 10/12/2021. Pathology Atypical WHO grade 2 meningioma, Ki-6 4-7%.\par \par TO REVIEW- 51 year old right handed female who initially presented with headaches and right eye pressure. She saw Dr. Guilherme Saravia and brain MRI demonstrated a left posterior frontal extra-axial mass. She had an MRI 8 years ago for same symptomatology, headaches. MRA was also performed and was unremarkable. She was found to have RIGHT papilledema on exam by Dr. Tao. \par It was felt due to young age and no comorbidities, papilledema on right and history of headaches, she was a candidate for resection. She underwent cerebral angiogram the day prior to surgery and surgical resection was successful. Post op ct scan showed small hemorrhage near craniotomy site. \par \par She denies headaches, dizziness, vision or speech disturbance or seizure activity. She states she feels well. Cranial incision healed nicely.\par Last ct scan was stable. \par She consulted with Dr. Reardon post operatively, and has a follow up appt at the end of June 2022 to discuss radiation options. \par MRI performed at [facility] 5/2022 shows no evidence of residual or recurrent enhancing mass. \par \par \par Plan: Dr. Reardon for follow up \par MRI brain w wo in 6 months with fu appt. \par

## 2022-07-25 ENCOUNTER — NON-APPOINTMENT (OUTPATIENT)
Age: 52
End: 2022-07-25

## 2022-09-22 ENCOUNTER — NON-APPOINTMENT (OUTPATIENT)
Age: 52
End: 2022-09-22

## 2022-09-22 ENCOUNTER — APPOINTMENT (OUTPATIENT)
Dept: FAMILY MEDICINE | Facility: CLINIC | Age: 52
End: 2022-09-22

## 2022-09-22 VITALS
DIASTOLIC BLOOD PRESSURE: 80 MMHG | WEIGHT: 109 LBS | OXYGEN SATURATION: 99 % | HEART RATE: 65 BPM | BODY MASS INDEX: 22.78 KG/M2 | TEMPERATURE: 98 F | SYSTOLIC BLOOD PRESSURE: 120 MMHG

## 2022-09-22 DIAGNOSIS — Z00.00 ENCOUNTER FOR GENERAL ADULT MEDICAL EXAMINATION W/OUT ABNORMAL FINDINGS: ICD-10-CM

## 2022-09-22 DIAGNOSIS — Z12.11 ENCOUNTER FOR SCREENING FOR MALIGNANT NEOPLASM OF COLON: ICD-10-CM

## 2022-09-22 DIAGNOSIS — Z12.39 ENCOUNTER FOR OTHER SCREENING FOR MALIGNANT NEOPLASM OF BREAST: ICD-10-CM

## 2022-09-22 DIAGNOSIS — E55.9 VITAMIN D DEFICIENCY, UNSPECIFIED: ICD-10-CM

## 2022-09-22 DIAGNOSIS — Z23 ENCOUNTER FOR IMMUNIZATION: ICD-10-CM

## 2022-09-22 PROCEDURE — 99396 PREV VISIT EST AGE 40-64: CPT | Mod: 25

## 2022-09-22 PROCEDURE — 90686 IIV4 VACC NO PRSV 0.5 ML IM: CPT

## 2022-09-22 PROCEDURE — 93000 ELECTROCARDIOGRAM COMPLETE: CPT

## 2022-09-22 PROCEDURE — G0008: CPT

## 2022-09-22 NOTE — HISTORY OF PRESENT ILLNESS
[FreeTextEntry1] : annual [de-identified] : 51 yo F with cerebral meningioma, chronic migraine presents for annual. Has been forgetful lately. + stressors.

## 2022-09-23 LAB
25(OH)D3 SERPL-MCNC: 33 NG/ML
ALBUMIN SERPL ELPH-MCNC: 4.4 G/DL
ALP BLD-CCNC: 79 U/L
ALT SERPL-CCNC: 12 U/L
ANION GAP SERPL CALC-SCNC: 11 MMOL/L
AST SERPL-CCNC: 16 U/L
BASOPHILS # BLD AUTO: 0.02 K/UL
BASOPHILS NFR BLD AUTO: 0.3 %
BILIRUB SERPL-MCNC: 0.4 MG/DL
BUN SERPL-MCNC: 17 MG/DL
CALCIUM SERPL-MCNC: 9.9 MG/DL
CHLORIDE SERPL-SCNC: 106 MMOL/L
CHOLEST SERPL-MCNC: 183 MG/DL
CO2 SERPL-SCNC: 24 MMOL/L
CREAT SERPL-MCNC: 0.63 MG/DL
EGFR: 107 ML/MIN/1.73M2
EOSINOPHIL # BLD AUTO: 0.15 K/UL
EOSINOPHIL NFR BLD AUTO: 2.1 %
ESTIMATED AVERAGE GLUCOSE: 117 MG/DL
GLUCOSE SERPL-MCNC: 94 MG/DL
HBA1C MFR BLD HPLC: 5.7 %
HCT VFR BLD CALC: 39 %
HDLC SERPL-MCNC: 52 MG/DL
HGB BLD-MCNC: 12.4 G/DL
IMM GRANULOCYTES NFR BLD AUTO: 0.1 %
LDLC SERPL CALC-MCNC: 117 MG/DL
LYMPHOCYTES # BLD AUTO: 2.41 K/UL
LYMPHOCYTES NFR BLD AUTO: 33 %
MAN DIFF?: NORMAL
MCHC RBC-ENTMCNC: 27.7 PG
MCHC RBC-ENTMCNC: 31.8 GM/DL
MCV RBC AUTO: 87.2 FL
MONOCYTES # BLD AUTO: 0.57 K/UL
MONOCYTES NFR BLD AUTO: 7.8 %
NEUTROPHILS # BLD AUTO: 4.14 K/UL
NEUTROPHILS NFR BLD AUTO: 56.7 %
NONHDLC SERPL-MCNC: 131 MG/DL
PLATELET # BLD AUTO: 211 K/UL
POTASSIUM SERPL-SCNC: 5 MMOL/L
PROT SERPL-MCNC: 7.8 G/DL
RBC # BLD: 4.47 M/UL
RBC # FLD: 13 %
SODIUM SERPL-SCNC: 142 MMOL/L
TRIGL SERPL-MCNC: 68 MG/DL
TSH SERPL-ACNC: 0.64 UIU/ML
VIT B12 SERPL-MCNC: 267 PG/ML
WBC # FLD AUTO: 7.3 K/UL

## 2022-09-27 ENCOUNTER — NON-APPOINTMENT (OUTPATIENT)
Age: 52
End: 2022-09-27

## 2022-09-28 ENCOUNTER — NON-APPOINTMENT (OUTPATIENT)
Age: 52
End: 2022-09-28

## 2022-09-28 LAB — HEMOCCULT STL QL IA: NEGATIVE

## 2022-12-28 ENCOUNTER — APPOINTMENT (OUTPATIENT)
Dept: NEUROSURGERY | Facility: CLINIC | Age: 52
End: 2022-12-28
Payer: COMMERCIAL

## 2022-12-28 VITALS
OXYGEN SATURATION: 98 % | BODY MASS INDEX: 22.04 KG/M2 | DIASTOLIC BLOOD PRESSURE: 78 MMHG | HEIGHT: 58 IN | SYSTOLIC BLOOD PRESSURE: 138 MMHG | HEART RATE: 82 BPM | TEMPERATURE: 98.2 F | WEIGHT: 105 LBS

## 2022-12-28 DIAGNOSIS — D32.0 BENIGN NEOPLASM OF CEREBRAL MENINGES: ICD-10-CM

## 2022-12-28 PROCEDURE — 99215 OFFICE O/P EST HI 40 MIN: CPT

## 2023-01-04 NOTE — ASSESSMENT
[FreeTextEntry1] : 2022\par \par \par \par Re:	Naya Schroeder \par :	1970\par \par The patient is 14 months status post left frontal craniotomy for parasagittal meningioma.  She is doing well.  The wound is flat, dry, and well healed.  Her hair is grown back, and she looks perfect.  She has no neurologic deficits.  Follow-up imaging, which is MRI brain with and without contrast, shows stable postoperative changes with no evidence of residual or recurrent tumor.  \par \par I told the patient, based on the WHO 2 classification, to see Dr. Reardon.  However, she is not going to go.  She does not feel that she needs radiation, and she is going to hold off on this advice.  I still think it is the right thing to do, but she is going to hold off.\par \par At the present time, all is stable.  She is doing well, and I will see her in one year’s time with follow-up imaging with and without gadolinium.\par \par \par \par Patrick Ayala M.D., F.A.C.S.\par [facility]\par

## 2023-01-04 NOTE — PHYSICAL EXAM
[FreeTextEntry1] : Awake, alert, and oriented x 3. VSS. In no apparent distress.   Short and long term memory intact.  Speech is clear and appropriate.  Affect is normal.  Voice is strong.  Respirations easy and even.  Normal skin color and pigmentation.  The sclera and conjunctiva normal.  Ears, nose, and neck normal in appearance.  EOMI, no nystagmus, facial sensation intact symmetrically, face symmetrical, hearing intact bilaterally, tongue and palate midline, head turning and shoulder shrug symmetric and no tongue deviation with protrusion.  No pronator drift.   No past-pointing, no tremors noted, no dysdiadochokinesia, and finger to nose dysmetria was not present.  Romberg negative.  \par Rises from a seated position in a comfortable fashion.\par \par Gait is well coordinated and stable without the use of an assistive device.   Able to perform tandem walk without loss of balance.   Motor strength in the upper extremities 5/5 in the biceps, triceps, and hand .  Motor strength in the lower extremities is 5/5 in the iliopsoas, quadriceps, and hamstrings.  \par \par

## 2023-01-04 NOTE — HISTORY OF PRESENT ILLNESS
[FreeTextEntry1] : Ms. Naya Schroeder presents to the office for a follow u p visit. She is s/p Left craniotomy for resection of parafalcine meningioma on 10/12/2021. Pathology Atypical WHO grade 2 meningioma, Ki-6 4-7%.\par \par TO REVIEW- 51 year old right handed female who initially presented with headaches and right eye pressure. She saw Dr. Guilherme Saravia and brain MRI demonstrated a left posterior frontal extra-axial mass. She had an MRI 8 years ago for same symptomatology, headaches. MRA was also performed and was unremarkable. She was found to have RIGHT papilledema on exam by Dr. Tao. \par It was felt due to young age and no comorbidities, papilledema on right and history of headaches, she was a candidate for resection. She underwent cerebral angiogram the day prior to surgery and surgical resection was successful. Post op ct scan showed small hemorrhage near craniotomy site. \par \par She denies headaches, dizziness, vision or speech disturbance or seizure activity. She states she feels well. Cranial incision healed nicely.\par Last ct scan was stable. \par She decided not to consult with Dr. Reardon because she felt it was not necessary.  \par MRI performed at [facility] 5/2022 shows no evidence of residual or recurrent enhancing mass. \par MRI 12/2022 at [facility] shows no evidence of residual or recurrent mass. \par \par \par Plan:fu in one year with MRI w wo\par

## 2023-08-01 NOTE — PRE-ANESTHESIA EVALUATION ADULT - WEIGHT IN LBS
Med Name & Dose:   liraglutide - weight management (SAXENDA) 18 MG/3ML pen-injector 15 mL 0 7/25/2023     Sig: Take 0.6mg daily for a week, take 1.2 mg daily for a week, take 1.8 mg daily for a week , take 2.4 mg daily for a week, take 3 mg daily for a week and stay on it    Sent to pharmacy as: Liraglutide -Weight Management 18 MG/3ML Subcutaneous Solution Pen-injector (SAXENDA)    Class: Eprescribe    E-Prescribing Status: Receipt confirmed by pharmacy (7/25/2023 12:00 AM CDT)    No prior authorization was found for this prescription.    Found prior authorization for another prescription for the same medication: Closed - Prior Authorization not required for patient/medication        [facility] did not  [facility] Out of [facility]. Resending to pharmacy requested   108.2

## 2023-09-19 NOTE — OCCUPATIONAL THERAPY INITIAL EVALUATION ADULT - RANGE OF MOTION EXAMINATION
bilat UEs WFLs/no Active ROM deficits were identified

## 2025-03-26 NOTE — HEALTH RISK ASSESSMENT
[de-identified] : active but does not exercise [de-identified] : regular Detail Level: Generalized Detail Level: Zone Detail Level: Simple Detail Level: Detailed